# Patient Record
Sex: FEMALE | Race: WHITE | NOT HISPANIC OR LATINO | Employment: OTHER | URBAN - METROPOLITAN AREA
[De-identification: names, ages, dates, MRNs, and addresses within clinical notes are randomized per-mention and may not be internally consistent; named-entity substitution may affect disease eponyms.]

---

## 2018-04-03 ENCOUNTER — HOSPITAL ENCOUNTER (EMERGENCY)
Facility: HOSPITAL | Age: 53
Discharge: HOME | End: 2018-04-03
Attending: EMERGENCY MEDICINE
Payer: MEDICARE

## 2018-04-03 VITALS
DIASTOLIC BLOOD PRESSURE: 85 MMHG | HEIGHT: 66 IN | WEIGHT: 116 LBS | BODY MASS INDEX: 18.64 KG/M2 | TEMPERATURE: 99 F | RESPIRATION RATE: 20 BRPM | HEART RATE: 106 BPM | SYSTOLIC BLOOD PRESSURE: 149 MMHG

## 2018-04-03 DIAGNOSIS — M54.2 NECK PAIN ON LEFT SIDE: ICD-10-CM

## 2018-04-03 DIAGNOSIS — R06.02 SHORTNESS OF BREATH: ICD-10-CM

## 2018-04-03 DIAGNOSIS — R07.89 LEFT-SIDED CHEST WALL PAIN: Primary | ICD-10-CM

## 2018-04-03 DIAGNOSIS — R06.02 SOB (SHORTNESS OF BREATH): ICD-10-CM

## 2018-04-03 DIAGNOSIS — R91.1 PULMONARY NODULE, LEFT: ICD-10-CM

## 2018-04-03 LAB
ALBUMIN SERPL BCP-MCNC: 4 G/DL
ALP SERPL-CCNC: 81 U/L
ALT SERPL W/O P-5'-P-CCNC: 24 U/L
ANION GAP SERPL CALC-SCNC: 11 MMOL/L
AST SERPL-CCNC: 27 U/L
BASOPHILS # BLD AUTO: 0 K/UL
BASOPHILS NFR BLD: 0.3 %
BILIRUB SERPL-MCNC: 0.8 MG/DL
BUN SERPL-MCNC: 7 MG/DL
CALCIUM SERPL-MCNC: 10 MG/DL
CHLORIDE SERPL-SCNC: 103 MMOL/L
CO2 SERPL-SCNC: 25 MMOL/L
CREAT SERPL-MCNC: 0.7 MG/DL
DIFFERENTIAL METHOD: ABNORMAL
EOSINOPHIL # BLD AUTO: 0.1 K/UL
EOSINOPHIL NFR BLD: 1 %
ERYTHROCYTE [DISTWIDTH] IN BLOOD BY AUTOMATED COUNT: 13.1 %
EST. GFR  (AFRICAN AMERICAN): >60 ML/MIN/1.73 M^2
EST. GFR  (NON AFRICAN AMERICAN): >60 ML/MIN/1.73 M^2
GLUCOSE SERPL-MCNC: 87 MG/DL
HCT VFR BLD AUTO: 42.9 %
HGB BLD-MCNC: 14.3 G/DL
INR PPP: 2.3
LYMPHOCYTES # BLD AUTO: 1.3 K/UL
LYMPHOCYTES NFR BLD: 13.6 %
MCH RBC QN AUTO: 30.6 PG
MCHC RBC AUTO-ENTMCNC: 33.2 G/DL
MCV RBC AUTO: 92 FL
MONOCYTES # BLD AUTO: 0.8 K/UL
MONOCYTES NFR BLD: 8.3 %
NEUTROPHILS # BLD AUTO: 7.5 K/UL
NEUTROPHILS NFR BLD: 76.8 %
PLATELET # BLD AUTO: 285 K/UL
PMV BLD AUTO: 7.8 FL
POTASSIUM SERPL-SCNC: 4.2 MMOL/L
PROT SERPL-MCNC: 8.2 G/DL
PROTHROMBIN TIME: 22.7 SEC
RBC # BLD AUTO: 4.66 M/UL
SODIUM SERPL-SCNC: 139 MMOL/L
TROPONIN I SERPL DL<=0.01 NG/ML-MCNC: <0.006 NG/ML
WBC # BLD AUTO: 9.8 K/UL

## 2018-04-03 PROCEDURE — 93005 ELECTROCARDIOGRAM TRACING: CPT

## 2018-04-03 PROCEDURE — 84484 ASSAY OF TROPONIN QUANT: CPT

## 2018-04-03 PROCEDURE — 99284 EMERGENCY DEPT VISIT MOD MDM: CPT | Mod: 25

## 2018-04-03 PROCEDURE — 85025 COMPLETE CBC W/AUTO DIFF WBC: CPT

## 2018-04-03 PROCEDURE — 25500020 PHARM REV CODE 255

## 2018-04-03 PROCEDURE — 36415 COLL VENOUS BLD VENIPUNCTURE: CPT

## 2018-04-03 PROCEDURE — 25000003 PHARM REV CODE 250: Performed by: PHYSICIAN ASSISTANT

## 2018-04-03 PROCEDURE — 80053 COMPREHEN METABOLIC PANEL: CPT

## 2018-04-03 PROCEDURE — 85610 PROTHROMBIN TIME: CPT

## 2018-04-03 RX ORDER — MECLIZINE HYDROCHLORIDE 25 MG/1
25 TABLET ORAL
Status: COMPLETED | OUTPATIENT
Start: 2018-04-03 | End: 2018-04-03

## 2018-04-03 RX ORDER — OXYCODONE AND ACETAMINOPHEN 5; 325 MG/1; MG/1
1 TABLET ORAL EVERY 6 HOURS PRN
Qty: 8 TABLET | Refills: 0 | Status: SHIPPED | OUTPATIENT
Start: 2018-04-03 | End: 2019-07-25

## 2018-04-03 RX ORDER — OXYCODONE AND ACETAMINOPHEN 5; 325 MG/1; MG/1
1 TABLET ORAL
Status: COMPLETED | OUTPATIENT
Start: 2018-04-03 | End: 2018-04-03

## 2018-04-03 RX ORDER — DICLOFENAC SODIUM 10 MG/G
2 GEL TOPICAL 4 TIMES DAILY PRN
Qty: 100 G | Refills: 0 | Status: SHIPPED | OUTPATIENT
Start: 2018-04-03 | End: 2021-04-28 | Stop reason: CLARIF

## 2018-04-03 RX ORDER — SODIUM CHLORIDE 9 MG/ML
INJECTION, SOLUTION INTRAVENOUS
Status: DISCONTINUED
Start: 2018-04-03 | End: 2018-04-03 | Stop reason: HOSPADM

## 2018-04-03 RX ADMIN — OXYCODONE AND ACETAMINOPHEN 1 TABLET: 5; 325 TABLET ORAL at 12:04

## 2018-04-03 RX ADMIN — LIDOCAINE HYDROCHLORIDE: 20 SOLUTION ORAL; TOPICAL at 10:04

## 2018-04-03 RX ADMIN — IOHEXOL 100 ML: 350 INJECTION, SOLUTION INTRAVENOUS at 11:04

## 2018-04-03 RX ADMIN — MECLIZINE HYDROCHLORIDE 25 MG: 25 TABLET ORAL at 10:04

## 2018-04-03 NOTE — ED NOTES
Pt reports shoulder pain increased with deep breath that started yesterday, pt awake alert in no acute distress, family at bedside

## 2018-04-03 NOTE — ED PROVIDER NOTES
"Encounter Date: 4/3/2018    SCRIBE #1 NOTE: IColby am scribing for, and in the presence of, Margarita Aguirre PA-C.       History     Chief Complaint   Patient presents with    Shoulder Pain     pain radiates to neck     Shortness of Breath       04/03/2018 10:21 AM     Chief complaint: Shoulder pain       Marianela Potter is a 52 y.o. female with a hx of Gastric paresis and Hypercoagulable state who presents to the ED with complaints of left shoulder pain with neck radiation associated with pleuritic pain and SOB x 2 days. She describes the neck pain as a shooting pain associated with neck stiffness. The pleuritic pain is a "burning rubbing" pain associated with inhalation. The sx are different then pervious DVT and PE associated sx. She also reports vertigo for sometime exacerbated with standing up after bending over. She took a Xanax at 1 pm and reports compliance with [place] and Coumadin. She has taken Coumadin x 20+ years due to a clotting disorder. Pt denies strenuous lifting, cough and congestion. Allergens include Morphine.       The history is provided by the patient.     Review of patient's allergies indicates:   Allergen Reactions    Morphine      Past Medical History:   Diagnosis Date    Gastric paresis     Hypercoagulable state      Past Surgical History:   Procedure Laterality Date    CHOLECYSTECTOMY      COLON SURGERY      HYSTERECTOMY      ILEOSTOMY      STOMACH SURGERY      TOTAL ABDOMINAL HYSTERECTOMY       Family History   Problem Relation Age of Onset    Colon cancer Neg Hx      Social History   Substance Use Topics    Smoking status: Never Smoker    Smokeless tobacco: Not on file    Alcohol use No     Review of Systems   Constitutional: Negative for activity change, appetite change, chills, fatigue and fever.   Eyes: Negative for visual disturbance.   Respiratory: Positive for shortness of breath. Negative for apnea.         + for "pleuritic pain"   Cardiovascular: " Negative for chest pain and palpitations.   Gastrointestinal: Negative for abdominal distention and abdominal pain.   Genitourinary: Negative for difficulty urinating.   Musculoskeletal: Positive for myalgias and neck pain.   Skin: Negative for pallor and rash.   Neurological: Positive for dizziness. Negative for headaches.   Hematological: Does not bruise/bleed easily.   Psychiatric/Behavioral: Negative for agitation.       Physical Exam     Initial Vitals [04/03/18 0945]   BP Pulse Resp Temp SpO2   (!) 196/106 106 20 98.5 °F (36.9 °C) --      MAP       136         Physical Exam    Nursing note and vitals reviewed.  Constitutional: She appears well-developed and well-nourished. She is not diaphoretic. No distress.   HENT:   Head: Normocephalic and atraumatic.   Right Ear: External ear normal.   Left Ear: External ear normal.   Nose: Nose normal.   Mouth/Throat: Oropharynx is clear and moist.   Eyes: Conjunctivae are normal.   Neck: Normal range of motion. Neck supple.   Cardiovascular: Normal rate, regular rhythm, normal heart sounds and intact distal pulses. Exam reveals no gallop and no friction rub.    No murmur heard.  Pulmonary/Chest: Breath sounds normal. No respiratory distress. She has no wheezes. She has no rhonchi. She has no rales.   Equal, bilateral breath sounds noted without wheezing.      Abdominal: Soft. She exhibits no distension and no mass. There is no tenderness.   Musculoskeletal: Normal range of motion. She exhibits tenderness. She exhibits no edema.        Left shoulder: She exhibits tenderness. She exhibits normal range of motion, no bony tenderness, no swelling, no effusion, no crepitus, no deformity, no laceration and normal strength.        Arms:  Small area of tenderness to left anterolateral neck superior to clavicle. No bony tenderness in left shoulder.     Lymphadenopathy:     She has no cervical adenopathy.   Neurological: She is alert and oriented to person, place, and time. She has  normal strength. No sensory deficit.   Skin: Skin is warm and dry. No rash and no abscess noted. No erythema.   Psychiatric: She has a normal mood and affect.         ED Course   Procedures  Labs Reviewed - No data to display  EKG Readings: (Independently Interpreted)   Initial Reading: No STEMI. Previous EKG: Compared with most recent EKG          Medical Decision Making:   History:   Old Medical Records: I decided to obtain old medical records.  Differential Diagnosis:   Fracture  Dislocation  Sprain  Contusion  Strain  Spasm  PE  Pneumonia  ACS    Clinical Tests:   Lab Tests: Ordered and Reviewed  Radiological Study: Ordered and Reviewed  Medical Tests: Ordered and Reviewed    Imaging Results          CTA Chest Non-Coronary (PE Study) (Final result)  Result time 04/03/18 12:48:15    Final result by Omid Melton MD (04/03/18 12:48:15)                 Impression:      No evidence for pulmonary thromboembolic disease.    New small left apical nodular opacity with surrounding ground-glass change.  Findings are nonspecific and may represent a focal infectious process, however follow-up in 3 months is recommended to exclude a neoplastic process.      Electronically signed by: Omid Melton MD  Date:    04/03/2018  Time:    12:48             Narrative:    EXAMINATION:  CTA CHEST NON CORONARY    CLINICAL HISTORY:  left chest and shoulder pain with SOB; hx PE and clotting disorder;    TECHNIQUE:  Low dose axial images, sagittal and coronal reformations were obtained from the thoracic inlet to the lung bases following the IV administration of 100 mL of Omnipaque 350.  Contrast timing was optimized to evaluate the pulmonary arteries.  MIP images were performed.    COMPARISON:  02/14/2016    FINDINGS:  The right and left main pulmonary arteries and their branch vessels demonstrate no filling defects to indicate pulmonary thromboemboli.    The heart size is normal without pericardial effusion.  There is no hilar  or mediastinal lymphadenopathy.    There is no consolidation or pleural effusion.  At the left lung apex, there is a 9 mm nodular opacity, with hazy surrounding ground-glass density, new since previous examination.    Biapical pleural calcification is noted.  There has been a cholecystectomy.  The bones are intact.                               X-Ray Chest PA And Lateral (Final result)  Result time 04/03/18 11:27:00    Final result by Yuliana Castañeda MD (04/03/18 11:27:00)                 Impression:      Findings consistent with COPD with no acute cardiopulmonary disease demonstrated.      Electronically signed by: Yuliana Castañeda MD  Date:    04/03/2018  Time:    11:27             Narrative:    EXAMINATION:  XR CHEST PA AND LATERAL    CLINICAL HISTORY:  Shortness of breath    TECHNIQUE:  PA and lateral views of the chest were performed.    COMPARISON:  None    FINDINGS:  The heart is not enlarged.  There is hyperinflation of the lungs.  There are apical calcifications suggesting pleural calcifications.  There is no confluent infiltrate or pleural effusion                                 APC / Resident Notes:   Labs stable.  Troponin negative.  INR at 2.3.  Given her history of previous PEs and DVTs, with hypercoagulable state, CTA of chest was performed, which is negative.  Her symptoms may be related to musculoskeletal etiology, given the reproducible TTP of anterolateral left sided neck.  Incidental finding of 9 mm left lung nodule at this apex.  She is made aware of the findings.  She will be discharged home to follow-up with her PCP for re-evaluation in 2-3 days.  She will be given a prescription for Percocet and Voltaren gel.  She voices understanding and is agreeable to the plan.  She is given specific return precautions.        Scribe Attestation:   Scribe #1: I performed the above scribed service and the documentation accurately describes the services I performed. I attest to the accuracy of the  note.    I, Margarita Aguirre PA-C, personally performed the services described in this documentation. All medical record entries made by the scribe were at my direction and in my presence.  I have reviewed the chart and agree that the record reflects my personal performance and is accurate and complete. Margarita Aguirre PA-C.  6:40 PM 04/03/2018             Clinical Impression:     1. SOB (shortness of breath)    2. Shortness of breath      1. Left-sided chest wall pain    2. SOB (shortness of breath)    3. Shortness of breath    4. Pulmonary nodule, left    5. Neck pain on left side                                 Margarita Aguirre PA-C  04/03/18 [id number]

## 2018-04-07 ENCOUNTER — HOSPITAL ENCOUNTER (EMERGENCY)
Facility: HOSPITAL | Age: 53
Discharge: HOME | End: 2018-04-07
Attending: EMERGENCY MEDICINE
Payer: MEDICARE

## 2018-04-07 VITALS
SYSTOLIC BLOOD PRESSURE: 102 MMHG | HEIGHT: 66 IN | TEMPERATURE: 97 F | RESPIRATION RATE: 18 BRPM | OXYGEN SATURATION: 96 % | HEART RATE: 90 BPM | WEIGHT: 113 LBS | BODY MASS INDEX: 18.16 KG/M2 | DIASTOLIC BLOOD PRESSURE: 57 MMHG

## 2018-04-07 DIAGNOSIS — J18.9 COMMUNITY ACQUIRED PNEUMONIA OF LEFT UPPER LOBE OF LUNG: Primary | ICD-10-CM

## 2018-04-07 DIAGNOSIS — R50.9 FEVER: ICD-10-CM

## 2018-04-07 DIAGNOSIS — R79.89 ELEVATED LFTS: ICD-10-CM

## 2018-04-07 LAB
ALBUMIN SERPL BCP-MCNC: 3.3 G/DL
ALP SERPL-CCNC: 134 U/L
ALT SERPL W/O P-5'-P-CCNC: 113 U/L
ANION GAP SERPL CALC-SCNC: 7 MMOL/L
AST SERPL-CCNC: 80 U/L
BACTERIA #/AREA URNS HPF: ABNORMAL /HPF
BASOPHILS # BLD AUTO: 0 K/UL
BASOPHILS NFR BLD: 0.1 %
BILIRUB SERPL-MCNC: 0.5 MG/DL
BILIRUB UR QL STRIP: NEGATIVE
BUN SERPL-MCNC: 7 MG/DL
CALCIUM SERPL-MCNC: 9.8 MG/DL
CHLORIDE SERPL-SCNC: 102 MMOL/L
CLARITY UR: CLEAR
CO2 SERPL-SCNC: 28 MMOL/L
COLOR UR: YELLOW
CREAT SERPL-MCNC: 0.7 MG/DL
DIFFERENTIAL METHOD: ABNORMAL
EOSINOPHIL # BLD AUTO: 0 K/UL
EOSINOPHIL NFR BLD: 0.3 %
ERYTHROCYTE [DISTWIDTH] IN BLOOD BY AUTOMATED COUNT: 12.8 %
EST. GFR  (AFRICAN AMERICAN): >60 ML/MIN/1.73 M^2
EST. GFR  (NON AFRICAN AMERICAN): >60 ML/MIN/1.73 M^2
FLUAV AG SPEC QL IA: NEGATIVE
FLUBV AG SPEC QL IA: NEGATIVE
GLUCOSE SERPL-MCNC: 114 MG/DL
GLUCOSE UR QL STRIP: NEGATIVE
HCT VFR BLD AUTO: 39 %
HGB BLD-MCNC: 12.9 G/DL
HGB UR QL STRIP: ABNORMAL
KETONES UR QL STRIP: NEGATIVE
LEUKOCYTE ESTERASE UR QL STRIP: ABNORMAL
LYMPHOCYTES # BLD AUTO: 0.7 K/UL
LYMPHOCYTES NFR BLD: 6.1 %
MCH RBC QN AUTO: 30.3 PG
MCHC RBC AUTO-ENTMCNC: 33 G/DL
MCV RBC AUTO: 92 FL
MICROSCOPIC COMMENT: ABNORMAL
MONOCYTES # BLD AUTO: 0.8 K/UL
MONOCYTES NFR BLD: 7 %
NEUTROPHILS # BLD AUTO: 10.4 K/UL
NEUTROPHILS NFR BLD: 86.5 %
NITRITE UR QL STRIP: NEGATIVE
PH UR STRIP: 7 [PH] (ref 5–8)
PLATELET # BLD AUTO: 324 K/UL
PMV BLD AUTO: 7.2 FL
POTASSIUM SERPL-SCNC: 4 MMOL/L
PROT SERPL-MCNC: 8 G/DL
PROT UR QL STRIP: NEGATIVE
RBC # BLD AUTO: 4.26 M/UL
RBC #/AREA URNS HPF: 20 /HPF (ref 0–4)
SODIUM SERPL-SCNC: 137 MMOL/L
SP GR UR STRIP: <=1.005 (ref 1–1.03)
SPECIMEN SOURCE: NORMAL
URN SPEC COLLECT METH UR: ABNORMAL
UROBILINOGEN UR STRIP-ACNC: NEGATIVE EU/DL
WBC # BLD AUTO: 12 K/UL
WBC #/AREA URNS HPF: 15 /HPF (ref 0–5)

## 2018-04-07 PROCEDURE — 96361 HYDRATE IV INFUSION ADD-ON: CPT

## 2018-04-07 PROCEDURE — 63600175 PHARM REV CODE 636 W HCPCS: Performed by: EMERGENCY MEDICINE

## 2018-04-07 PROCEDURE — 99284 EMERGENCY DEPT VISIT MOD MDM: CPT | Mod: 25

## 2018-04-07 PROCEDURE — 85025 COMPLETE CBC W/AUTO DIFF WBC: CPT

## 2018-04-07 PROCEDURE — 87400 INFLUENZA A/B EACH AG IA: CPT | Mod: 59

## 2018-04-07 PROCEDURE — 87086 URINE CULTURE/COLONY COUNT: CPT

## 2018-04-07 PROCEDURE — 96365 THER/PROPH/DIAG IV INF INIT: CPT

## 2018-04-07 PROCEDURE — 81000 URINALYSIS NONAUTO W/SCOPE: CPT

## 2018-04-07 PROCEDURE — 80053 COMPREHEN METABOLIC PANEL: CPT

## 2018-04-07 PROCEDURE — 36415 COLL VENOUS BLD VENIPUNCTURE: CPT

## 2018-04-07 PROCEDURE — 25000003 PHARM REV CODE 250: Performed by: EMERGENCY MEDICINE

## 2018-04-07 PROCEDURE — 96375 TX/PRO/DX INJ NEW DRUG ADDON: CPT

## 2018-04-07 RX ORDER — AMOXICILLIN AND CLAVULANATE POTASSIUM 250; 62.5 MG/5ML; MG/5ML
875 POWDER, FOR SUSPENSION ORAL 2 TIMES DAILY
Qty: 252 ML | Refills: 0 | Status: SHIPPED | OUTPATIENT
Start: 2018-04-07 | End: 2018-04-14

## 2018-04-07 RX ORDER — CEFTRIAXONE 1 G/50ML
1 INJECTION, SOLUTION INTRAVENOUS
Status: COMPLETED | OUTPATIENT
Start: 2018-04-07 | End: 2018-04-07

## 2018-04-07 RX ORDER — KETOROLAC TROMETHAMINE 30 MG/ML
10 INJECTION, SOLUTION INTRAMUSCULAR; INTRAVENOUS
Status: COMPLETED | OUTPATIENT
Start: 2018-04-07 | End: 2018-04-07

## 2018-04-07 RX ADMIN — SODIUM CHLORIDE 1000 ML: 0.9 INJECTION, SOLUTION INTRAVENOUS at 04:04

## 2018-04-07 RX ADMIN — KETOROLAC TROMETHAMINE 10 MG: 30 INJECTION, SOLUTION INTRAMUSCULAR at 04:04

## 2018-04-07 RX ADMIN — CEFTRIAXONE 1 G: 1 INJECTION, SOLUTION INTRAVENOUS at 05:04

## 2018-04-07 NOTE — ED PROVIDER NOTES
Encounter Date: 4/7/2018    SCRIBE #1 NOTE: I, Regis Mann, am scribing for, and in the presence of, Dr. Carrasquillo.       History     Chief Complaint   Patient presents with    Fever     s/s x 3 days    Generalized Body Aches       04/07/2018 2:42 PM     Chief complaint: Fever      Marianela Potter is a 52 y.o. female with a PMHx of gastric paresis  who presents to the ED accompanied by her  c/o a fever. She states she presented to this ER Tuesday with shoulder pain. She states she began to have a fever on Wednesday and thought she had a virus. She reports associated symptoms of body aches. She says her fever was as high as 103. She denies having any painful urination, blood in her urine, cough or chest pain. She has been taking tylenol for her fever. Allergens include Morphine.       The history is provided by the patient.     Review of patient's allergies indicates:   Allergen Reactions    Morphine      Past Medical History:   Diagnosis Date    Gastric paresis     Hypercoagulable state      Past Surgical History:   Procedure Laterality Date    CHOLECYSTECTOMY      COLON SURGERY      HYSTERECTOMY      ILEOSTOMY      STOMACH SURGERY      TOTAL ABDOMINAL HYSTERECTOMY       Family History   Problem Relation Age of Onset    Colon cancer Neg Hx      Social History   Substance Use Topics    Smoking status: Never Smoker    Smokeless tobacco: Not on file    Alcohol use No     Review of Systems   Constitutional: Positive for fever (reported at 103).   HENT: Negative for sore throat.    Respiratory: Negative for shortness of breath.    Cardiovascular: Negative for chest pain.   Gastrointestinal: Negative for nausea.   Genitourinary: Negative for dysuria.   Musculoskeletal: Negative for back pain.        Generalized body aches   Skin: Negative for rash.   Neurological: Negative for weakness.   Hematological: Does not bruise/bleed easily.       Physical Exam     Initial Vitals [04/07/18 1435]   BP  Pulse Resp Temp SpO2   (!) 151/79 (!) 115 20 100 °F (37.8 °C) 97 %      MAP       103         Physical Exam    Nursing note and vitals reviewed.  Constitutional: She appears well-developed and well-nourished. She is not diaphoretic. No distress.   HENT:   Head: Normocephalic and atraumatic.   Eyes: EOM are normal. Pupils are equal, round, and reactive to light.   Neck: Normal range of motion. Neck supple.   Cardiovascular: Normal rate, regular rhythm, normal heart sounds and intact distal pulses. Exam reveals no gallop and no friction rub.    No murmur heard.  Pulmonary/Chest: Breath sounds normal. No respiratory distress. She has no wheezes. She has no rhonchi. She has no rales.   Abdominal: Soft. Bowel sounds are normal. There is no tenderness. There is no rebound and no guarding.   Ileostomy noticed in RLQ of abdomen    Musculoskeletal: Normal range of motion.   Neurological: She is alert and oriented to person, place, and time.   Skin: Skin is warm and dry.   Psychiatric: She has a normal mood and affect. Her behavior is normal. Judgment and thought content normal.         ED Course   Procedures  Labs Reviewed   COMPREHENSIVE METABOLIC PANEL - Abnormal; Notable for the following:        Result Value    Glucose 114 (*)     Albumin 3.3 (*)     AST 80 (*)      (*)     Anion Gap 7 (*)     All other components within normal limits   CBC W/ AUTO DIFFERENTIAL - Abnormal; Notable for the following:     MPV 7.2 (*)     Gran # (ANC) 10.4 (*)     Lymph # 0.7 (*)     Gran% 86.5 (*)     Lymph% 6.1 (*)     All other components within normal limits   URINALYSIS - Abnormal; Notable for the following:     Specific Gravity, UA <=1.005 (*)     Occult Blood UA 2+ (*)     Leukocytes, UA 1+ (*)     All other components within normal limits   URINALYSIS MICROSCOPIC - Abnormal; Notable for the following:     RBC, UA 20 (*)     WBC, UA 15 (*)     Bacteria, UA Few (*)     All other components within normal limits   CULTURE, URINE    INFLUENZA A AND B ANTIGEN             Medical Decision Making:   History:   Old Medical Records: I decided to obtain old medical records.  Initial Assessment:   52-year-old female presented with a chief complaint of a fever.  Differential Diagnosis:   Initial differential diagnosis included but not limited to Viral illness, pneumonia, and UTI.  Clinical Tests:   Lab Tests: Ordered and Reviewed  Radiological Study: Reviewed and Ordered  ED Management:  The patient was emergently evaluated in the emergency department, her evaluation was significant for middle-aged female with a benign abdominal exam.  The patient's labs were significant for blood in her urine, and mildly elevated LFTs.  The patient's chest x-ray shows a worsening left upper lobe opacification, that could be pneumonia.  I believe this is likely pneumonia given the constellation of the patient's symptoms.  The patient also reports that she always has blood in her urine and denies any urinary complaints at this time.  We have sent a urine culture off this.  The patient's pneumonia was treated with a dose of IV Rocephin in the emergency department.  The patient is stable for discharge to home.  The patient has asked to be discharged home on liquid antibiotics.  I will discharge her to home on by mouth Augmentin.  She is to follow-up with her PCP for further care.            Scribe Attestation:   Scribe #1: I performed the above scribed service and the documentation accurately describes the services I performed. I attest to the accuracy of the note.              I, Dr. Davion Carrasquillo, personally performed the services described in this documentation. All medical record entries made by the scribe were at my direction and in my presence.  I have reviewed the chart and agree that the record reflects my personal performance and is accurate and complete. Davion Carrasquillo MD.  5:19 PM 04/07/2018    Clinical Impression:   The primary encounter diagnosis was  Community acquired pneumonia of left upper lobe of lung. A diagnosis of Fever was also pertinent to this visit.                           Davion Carrasquillo MD  04/07/18 [id number]

## 2018-04-09 LAB — BACTERIA UR CULT: NO GROWTH

## 2018-11-28 ENCOUNTER — TELEPHONE (OUTPATIENT)
Dept: HEMATOLOGY/ONCOLOGY | Facility: CLINIC | Age: 53
End: 2018-11-28

## 2018-11-28 NOTE — TELEPHONE ENCOUNTER
Spoke with pt and explained that I will need a referral from dr. bai to see her. She said she will stay on top of them to make sure they send it to me. I will contact pt for apt as soon as I get a referral and it is approved by dr moreno.

## 2018-11-28 NOTE — TELEPHONE ENCOUNTER
----- Message from Alva Keyes sent at 11/28/2018  9:47 AM CST -----  Regarding: old/new pt appt  Patient wants to be scheduled with Dr Charlton to have him monitor her lovenox. Last visit w Dr. Charlton was in 2012 and has had Dr. Gallardo managing her up till now. I advised pt to contact Dr. Gallardo office and have her recent records faxed over.

## 2019-01-16 ENCOUNTER — OFFICE VISIT (OUTPATIENT)
Dept: HEMATOLOGY/ONCOLOGY | Facility: CLINIC | Age: 54
End: 2019-01-16
Payer: MEDICARE

## 2019-01-16 VITALS
SYSTOLIC BLOOD PRESSURE: 163 MMHG | WEIGHT: 113 LBS | BODY MASS INDEX: 18.16 KG/M2 | HEIGHT: 66 IN | RESPIRATION RATE: 18 BRPM | TEMPERATURE: 98 F | DIASTOLIC BLOOD PRESSURE: 88 MMHG | HEART RATE: 96 BPM

## 2019-01-16 DIAGNOSIS — Z86.73 HISTORY OF TRANSIENT ISCHEMIC ATTACK (TIA): ICD-10-CM

## 2019-01-16 DIAGNOSIS — Z83.79 FAMILY HISTORY OF CHRONIC ULCERATIVE COLITIS: ICD-10-CM

## 2019-01-16 DIAGNOSIS — Z15.89 HOMOZYGOUS MTHFR MUTATION C677T: ICD-10-CM

## 2019-01-16 DIAGNOSIS — Z95.828 S/P INSERTION OF IVC (INFERIOR VENA CAVAL) FILTER: ICD-10-CM

## 2019-01-16 DIAGNOSIS — R91.1 NODULE OF UPPER LOBE OF RIGHT LUNG: ICD-10-CM

## 2019-01-16 DIAGNOSIS — Z90.49 HISTORY OF TOTAL COLECTOMY: ICD-10-CM

## 2019-01-16 DIAGNOSIS — K51.011 ULCERATIVE PANCOLITIS WITH RECTAL BLEEDING: ICD-10-CM

## 2019-01-16 DIAGNOSIS — D68.52 PROTHROMBIN GENE MUTATION: ICD-10-CM

## 2019-01-16 DIAGNOSIS — Z86.711 HISTORY OF PULMONARY EMBOLUS (PE): ICD-10-CM

## 2019-01-16 PROCEDURE — 99204 OFFICE O/P NEW MOD 45 MIN: CPT | Mod: ,,, | Performed by: INTERNAL MEDICINE

## 2019-01-16 PROCEDURE — 99204 PR OFFICE/OUTPT VISIT, NEW, LEVL IV, 45-59 MIN: ICD-10-PCS | Mod: ,,, | Performed by: INTERNAL MEDICINE

## 2019-01-16 RX ORDER — WARFARIN 4 MG/1
4 TABLET ORAL NIGHTLY
COMMUNITY
End: 2022-04-22 | Stop reason: SDUPTHER

## 2019-01-16 RX ORDER — LISINOPRIL 2.5 MG/1
2.5 TABLET ORAL DAILY
COMMUNITY
End: 2021-04-28 | Stop reason: CLARIF

## 2019-01-18 ENCOUNTER — TELEPHONE (OUTPATIENT)
Dept: HEMATOLOGY/ONCOLOGY | Facility: CLINIC | Age: 54
End: 2019-01-18

## 2019-01-18 PROBLEM — Z83.79 FAMILY HISTORY OF CHRONIC ULCERATIVE COLITIS: Status: ACTIVE | Noted: 2019-01-18

## 2019-01-18 PROBLEM — Z90.49 HISTORY OF TOTAL COLECTOMY: Status: ACTIVE | Noted: 2019-01-18

## 2019-01-18 PROBLEM — Z95.828 S/P INSERTION OF IVC (INFERIOR VENA CAVAL) FILTER: Status: ACTIVE | Noted: 2019-01-18

## 2019-01-18 PROBLEM — R91.1 NODULE OF UPPER LOBE OF RIGHT LUNG: Status: ACTIVE | Noted: 2019-01-18

## 2019-01-18 PROBLEM — K51.90 ULCERATIVE COLITIS: Status: ACTIVE | Noted: 2019-01-18

## 2019-01-18 PROBLEM — Z15.89 HOMOZYGOUS MTHFR MUTATION C677T: Status: ACTIVE | Noted: 2019-01-18

## 2019-01-18 PROBLEM — Z86.711 HISTORY OF PULMONARY EMBOLUS (PE): Status: ACTIVE | Noted: 2019-01-18

## 2019-01-18 PROBLEM — D68.52 PROTHROMBIN GENE MUTATION: Status: ACTIVE | Noted: 2019-01-18

## 2019-01-18 PROBLEM — Z86.73 HISTORY OF TRANSIENT ISCHEMIC ATTACK (TIA): Status: ACTIVE | Noted: 2019-01-18

## 2019-01-18 NOTE — PROGRESS NOTES
[facility] History & Physical    Subjective:      Patient ID:   Marianela Potter  53 y.o. female  1965  [place]      Chief Complaint:   PE hx    HPI:  53 y.o. female with remote history of pulmonary embolus ×2.    Her past history is complex. She has history of ulcerative colitis and had proctocolectomy with ileostomy in 1995. She also had a cholecystectomy in 2005. This was followed by a complete hysterectomy in October 2012 for endometriosis. 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 She was also diagnosed with gastroparesis in 2008.    She has a history of iron deficiency anemia and has been given Ferrlicet infusions in the past in 5/2012.  She has taken Reglan intermittently to help with gastroparesis symptoms.    She is status post pulmonary embolus ×2, TIA ×1, and has a inferior vena cava filter in place.    Hypercoagulation evaluation was done in 2011. She is heterozygous positive for   Prothrombin gene mutation. She is homozygous positive for MT HFR mutation. She had been on Coumadin and aspirin at that time and Foltx was added also.    Recently she had pneumonia in 2018. A 9 mm nodule was found in the left upper lobe in June 2018. Recent PET scan showed slight uptake in the nodule. Bronchoscopy was done. Cultures are pending and are negative thus far, malignancy was not identified, and she is to follow-up with Dr. Brush of pulmonary for this.    She is presently on Coumadin 4 mg daily, INR has been 2.3, she has been covered with Lovenox bridge as needed in the past.    She has a history of allergy to morphine as manifested with itch. She does not smoke. She does not drink alcohol with regularity. She has worked at [employer] for several years.    She has family history of hypertension and pulmonary embolus. She has a sister with breast cancer history.    ROS:   GEN: normal without  any fever, night sweats or weight loss  HEENT: normal with no HA's, sore throat, stiff neck, changes in vision  CV: normal with no CP, SOB, PND, VERDUZCO or orthopnea  PULM: See history of present illness  GI: See history of present illness  : normal with no hematuria, dysuria  BREAST: normal with no mass, discharge, pain  SKIN: normal with no rash, erythema, bruising, or swelling     Past Medical History:   Diagnosis Date    Clotting disorder     Gastric paresis     Hypercoagulable state      Past Surgical History:   Procedure Laterality Date    CHOLECYSTECTOMY      COLON SURGERY      HYSTERECTOMY      ILEOSTOMY      STOMACH SURGERY      superior Mesenteric Artery Syndrome      TOTAL ABDOMINAL HYSTERECTOMY         Review of patient's allergies indicates:   Allergen Reactions    Morphine      Social History     Socioeconomic History    Marital status:      Spouse name: Not on file    Number of children: Not on file    Years of education: Not on file    Highest education level: Not on file   Social Needs    Financial resource strain: Not on file    Food insecurity - worry: Not on file    Food insecurity - inability: Not on file    Transportation needs - medical: Not on file    Transportation needs - non-medical: Not on file   Occupational History    Not on file   Tobacco Use    Smoking status: Never Smoker   Substance and Sexual Activity    Alcohol use: No    Drug use: No    Sexual activity: Not on file   Other Topics Concern    Not on file   Social History Narrative    Not on file         Current Outpatient Medications:     alprazolam (XANAX) 1 MG tablet, Take 2 mg by mouth nightly as needed for Anxiety., Disp: , Rfl:     hydrocodone-acetaminophen 10-325mg (NORCO)  mg Tab, Take by mouth., Disp: , Rfl:     lisinopril (PRINIVIL,ZESTRIL) 2.5 MG tablet, Take 2.5 mg by mouth once daily., Disp: , Rfl:     metoclopramide HCl (REGLAN) 10 MG tablet, Take 10 mg by mouth 4 (four) times  "daily., Disp: , Rfl:     promethazine (PHENERGAN) 25 MG tablet, Take 25 mg by mouth daily as needed for Nausea., Disp: , Rfl:     warfarin (COUMADIN) 4 MG tablet, Take 4 mg by mouth Daily., Disp: , Rfl:     diclofenac sodium 1 % Gel, Apply 2 g topically 4 (four) times daily as needed., Disp: 100 g, Rfl: 0    doxycycline (VIBRAMYCIN) 100 MG Cap, Take 100 mg by mouth 2 (two) times daily., Disp: , Rfl:     lipase-protease-amylase 24,000-76,000-120,000 units (CREON) 24,000-76,000 -120,000 unit capsule, Take 1 capsule by mouth 3 (three) times daily with meals., Disp: 90 capsule, Rfl: 11    ondansetron (ZOFRAN) 8 MG tablet, Take 8 mg by mouth every 8 (eight) hours., Disp: , Rfl:     oxyCODONE-acetaminophen (PERCOCET) 5-325 mg per tablet, Take 1 tablet by mouth every 6 (six) hours as needed for Pain., Disp: 8 tablet, Rfl: 0    pantoprazole (PROTONIX) 40 MG tablet, Take 40 mg by mouth once daily., Disp: , Rfl:     warfarin (COUMADIN) 5 MG tablet, Take 5 mg by mouth every Mon, Wed, Fri. 2.5 mg other days, Disp: , Rfl:           Objective:   Vitals:  Blood pressure (!) 163/88, pulse 96, temperature 98 °F (36.7 °C), resp. rate 18, height 5' 6" (1.676 m), weight 51.3 kg (113 lb).    Physical Examination:   GEN: no apparent distress, comfortable  HEAD: atraumatic and normocephalic  EYES: no pallor, no icterus  ENT:  no pharyngeal erythema, external ears WNL; no nasal discharge; no thrush  NECK: no masses, thyroid normal, trachea midline, no LAD/LN's, supple  CV: RRR with no murmur; normal pulse; normal S1 and S2; no pedal edema  CHEST: Normal respiratory effort; CTAB; normal breath sounds; no wheeze or crackles  ABDOM: nontender and nondistended; soft; normal bowel sounds; no rebound/guarding, liver and spleen are not palpable, ileostomy is intact and appears functional.  MUSC/Skeletal: ROM normal; no crepitus; joints normal; no deformities or arthropathy  EXTREM: no clubbing, cyanosis, inflammation or swelling  SKIN: " no rashes, lesions, ulcers, petechiae or subcutaneous nodules  : no cedillo  NEURO: grossly intact; motor/sensory WNL; no tremors  PSYCH: normal mood, affect and behavior  LYMPH: normal cervical, supraclavicular, axillary and groin LN's  BREASTS: Nontender without palpable mass at the left or right breast    Labs:   Lab Results   Component Value Date    WBC 12.00 04/07/2018    HGB 12.9 04/07/2018    HCT 39.0 04/07/2018    MCV 92 04/07/2018     04/07/2018    CMP  Sodium   Date Value Ref Range Status   04/07/2018 137 136 - 145 mmol/L Final     Potassium   Date Value Ref Range Status   04/07/2018 4.0 3.5 - 5.1 mmol/L Final     Chloride   Date Value Ref Range Status   04/07/2018 102 95 - 110 mmol/L Final     CO2   Date Value Ref Range Status   04/07/2018 28 23 - 29 mmol/L Final     Glucose   Date Value Ref Range Status   04/07/2018 114 (H) 70 - 110 mg/dL Final     BUN, Bld   Date Value Ref Range Status   04/07/2018 7 6 - 20 mg/dL Final     Creatinine   Date Value Ref Range Status   04/07/2018 0.7 0.5 - 1.4 mg/dL Final     Calcium   Date Value Ref Range Status   04/07/2018 9.8 8.7 - 10.5 mg/dL Final     Total Protein   Date Value Ref Range Status   04/07/2018 8.0 6.0 - 8.4 g/dL Final     Albumin   Date Value Ref Range Status   04/07/2018 3.3 (L) 3.5 - 5.2 g/dL Final     Total Bilirubin   Date Value Ref Range Status   04/07/2018 0.5 0.1 - 1.0 mg/dL Final     Comment:     For infants and newborns, interpretation of results should be based  on gestational age, weight and in agreement with clinical  observations.  Premature Infant recommended reference ranges:  Up to 24 hours.............<8.0 mg/dL  Up to 48 hours............<12.0 mg/dL  3-5 days..................<15.0 mg/dL  6-29 days.................<15.0 mg/dL       Alkaline Phosphatase   Date Value Ref Range Status   04/07/2018 134 55 - 135 U/L Final     AST   Date Value Ref Range Status   04/07/2018 80 (H) 10 - 40 U/L Final     ALT   Date Value Ref Range Status    04/07/2018 113 (H) 10 - 44 U/L Final     Anion Gap   Date Value Ref Range Status   04/07/2018 7 (L) 8 - 16 mmol/L Final     eGFR if    Date Value Ref Range Status   04/07/2018 >60 >60 mL/min/1.73 m^2 Final     eGFR if non    Date Value Ref Range Status   04/07/2018 >60 >60 mL/min/1.73 m^2 Final     Comment:     Calculation used to obtain the estimated glomerular filtration  rate (eGFR) is the CKD-EPI equation.        Lab per Dr. Flynn show vitamin D low at 24.8, creatinine normal at 0.5, B-12 normal at 1143, folate greater than 20, ferritin is normal at 89    Assessment:   (1) 53 y.o. female with diagnosis of pulmonary embolus ×2 and TIA ×1 .  She has history of heterozygous prothrombin gene mutation and homozygous   MTHFR mutation.  Status post IVC filter placement.    She is on Coumadin prophylaxis, 4 mg by mouth daily, INR will be checked monthly.    (2) she has history of ulcerative colitis, status post proctocolectomy with ileostomy. 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  History of gastroparesis.    (3) right upper lobe nodule, under evaluation per Dr. Brush of pulmonary.    (4) check homocysteine level, she is not all Foltx or multivitamin    Have discussed with her, that we could change her over to Xarelto, Eliquis or Pradaxa  Prophylaxis long-term and discontinue her Coumadin. The dosage or administration of these medications would probably not be influenced by her diet, oral intake, or other medications. She is hesitant to come off her Coumadin and go to another drug.    For now she will get her lab work done and follow-up here in 1 or 2 weeks. I will titrate her Coumadin long-term.

## 2019-01-23 ENCOUNTER — OFFICE VISIT (OUTPATIENT)
Dept: HEMATOLOGY/ONCOLOGY | Facility: CLINIC | Age: 54
End: 2019-01-23
Payer: MEDICARE

## 2019-01-23 VITALS
OXYGEN SATURATION: 99 % | BODY MASS INDEX: 18.24 KG/M2 | HEART RATE: 80 BPM | DIASTOLIC BLOOD PRESSURE: 92 MMHG | WEIGHT: 113 LBS | SYSTOLIC BLOOD PRESSURE: 155 MMHG | TEMPERATURE: 98 F

## 2019-01-23 DIAGNOSIS — Z86.711 HISTORY OF PULMONARY EMBOLUS (PE): Primary | ICD-10-CM

## 2019-01-23 DIAGNOSIS — Z95.828 S/P INSERTION OF IVC (INFERIOR VENA CAVAL) FILTER: ICD-10-CM

## 2019-01-23 DIAGNOSIS — R91.1 NODULE OF UPPER LOBE OF RIGHT LUNG: ICD-10-CM

## 2019-01-23 DIAGNOSIS — Z83.79 FAMILY HISTORY OF CHRONIC ULCERATIVE COLITIS: ICD-10-CM

## 2019-01-23 DIAGNOSIS — D68.52 PROTHROMBIN GENE MUTATION: ICD-10-CM

## 2019-01-23 DIAGNOSIS — Z15.89 HOMOZYGOUS MTHFR MUTATION C677T: ICD-10-CM

## 2019-01-23 DIAGNOSIS — Z90.49 HISTORY OF TOTAL COLECTOMY: ICD-10-CM

## 2019-01-23 DIAGNOSIS — Z86.73 HISTORY OF TRANSIENT ISCHEMIC ATTACK (TIA): ICD-10-CM

## 2019-01-23 PROCEDURE — 99213 OFFICE O/P EST LOW 20 MIN: CPT | Mod: ,,, | Performed by: INTERNAL MEDICINE

## 2019-01-23 PROCEDURE — 99213 PR OFFICE/OUTPT VISIT, EST, LEVL III, 20-29 MIN: ICD-10-PCS | Mod: ,,, | Performed by: INTERNAL MEDICINE

## 2019-01-23 NOTE — PROGRESS NOTES
[facility] History & Physical    Subjective:      Patient ID:   Marianela Potter  53 y.o. female  1965        Chief Complaint:   PE hx    HPI:  53 y.o. female with remote history of pulmonary embolus ×2.    Her past history is complex. She has history of ulcerative colitis and had proctocolectomy with ileostomy in 1995. She also had a cholecystectomy in 2005. This was followed by a complete hysterectomy in October 2012 for endometriosis. 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 She was also diagnosed with gastroparesis in 2008.    She has a history of iron deficiency anemia and has been given Ferrlicet infusions in the past in 5/2012.  She has taken Reglan intermittently to help with gastroparesis symptoms.    She is status post pulmonary embolus ×2, TIA ×1, and has a inferior vena cava filter in place.    Hypercoagulation evaluation was done in 2011. She is heterozygous positive for   Prothrombin gene mutation. She is homozygous positive for MT HFR mutation. She had been on Coumadin and aspirin at that time and Foltx was added also.    Recently she had pneumonia in 2018. A 9 mm nodule was found in the left upper lobe in June 2018. Recent PET scan showed slight uptake in the nodule. Bronchoscopy was done. Cultures are pending and are negative thus far, malignancy was not identified, and she is to follow-up with Dr. Brush of pulmonary for this.    She is presently on Coumadin 4 mg daily, INR has been 2.3, she has been covered with Lovenox bridge as needed in the past.    She has a history of allergy to morphine as manifested with itch. She does not smoke. She does not drink alcohol with regularity. She has worked at [employer] for several years.    She has family history of hypertension and pulmonary embolus. She has a sister with breast cancer history.    ROS:   GEN: normal without any  fever, night sweats or weight loss  HEENT: normal with no HA's, sore throat, stiff neck, changes in vision  CV: normal with no CP, SOB, PND, VERDUZCO or orthopnea  PULM: See history of present illness  GI: See history of present illness  : normal with no hematuria, dysuria  BREAST: normal with no mass, discharge, pain  SKIN: normal with no rash, erythema, bruising, or swelling     Past Medical History:   Diagnosis Date    Clotting disorder     Gastric paresis     Hypercoagulable state      Past Surgical History:   Procedure Laterality Date    CHOLECYSTECTOMY      COLON SURGERY      HYSTERECTOMY      ILEOSTOMY      STOMACH SURGERY      superior Mesenteric Artery Syndrome      TOTAL ABDOMINAL HYSTERECTOMY         Review of patient's allergies indicates:   Allergen Reactions    Morphine      Social History     Socioeconomic History    Marital status:      Spouse name: Not on file    Number of children: Not on file    Years of education: Not on file    Highest education level: Not on file   Social Needs    Financial resource strain: Not on file    Food insecurity - worry: Not on file    Food insecurity - inability: Not on file    Transportation needs - medical: Not on file    Transportation needs - non-medical: Not on file   Occupational History    Not on file   Tobacco Use    Smoking status: Never Smoker   Substance and Sexual Activity    Alcohol use: No    Drug use: No    Sexual activity: Not on file   Other Topics Concern    Not on file   Social History Narrative    Not on file         Current Outpatient Medications:     alprazolam (XANAX) 1 MG tablet, Take 2 mg by mouth nightly as needed for Anxiety., Disp: , Rfl:     doxycycline (VIBRAMYCIN) 100 MG Cap, Take 100 mg by mouth 2 (two) times daily., Disp: , Rfl:     hydrocodone-acetaminophen 10-325mg (NORCO)  mg Tab, Take by mouth., Disp: , Rfl:     lisinopril (PRINIVIL,ZESTRIL) 2.5 MG tablet, Take 2.5 mg by mouth once daily.,  Disp: , Rfl:     metoclopramide HCl (REGLAN) 10 MG tablet, Take 10 mg by mouth 4 (four) times daily., Disp: , Rfl:     ondansetron (ZOFRAN) 8 MG tablet, Take 8 mg by mouth every 8 (eight) hours., Disp: , Rfl:     oxyCODONE-acetaminophen (PERCOCET) 5-325 mg per tablet, Take 1 tablet by mouth every 6 (six) hours as needed for Pain., Disp: 8 tablet, Rfl: 0    pantoprazole (PROTONIX) 40 MG tablet, Take 40 mg by mouth once daily., Disp: , Rfl:     promethazine (PHENERGAN) 25 MG tablet, Take 25 mg by mouth daily as needed for Nausea., Disp: , Rfl:     warfarin (COUMADIN) 4 MG tablet, Take 4 mg by mouth Daily., Disp: , Rfl:     warfarin (COUMADIN) 5 MG tablet, Take 5 mg by mouth every Mon, Wed, Fri. 2.5 mg other days, Disp: , Rfl:     diclofenac sodium 1 % Gel, Apply 2 g topically 4 (four) times daily as needed., Disp: 100 g, Rfl: 0    lipase-protease-amylase 24,000-76,000-120,000 units (CREON) 24,000-76,000 -120,000 unit capsule, Take 1 capsule by mouth 3 (three) times daily with meals., Disp: 90 capsule, Rfl: 11          Objective:   Vitals:  Blood pressure (!) 155/92, pulse 80, temperature 98.2 °F (36.8 °C), weight 51.3 kg (113 lb), SpO2 99 %.    Physical Examination:   GEN: no apparent distress, comfortable  HEAD: atraumatic and normocephalic  EYES: no pallor, no icterus  ENT:  no pharyngeal erythema, external ears WNL; no nasal discharge; no thrush  NECK: no masses, thyroid normal, trachea midline, no LAD/LN's, supple  CV: RRR with no murmur; normal pulse; normal S1 and S2; no pedal edema  CHEST: Normal respiratory effort; CTAB; normal breath sounds; no wheeze or crackles  ABDOM: nontender and nondistended; soft;  no rebound/guarding, liver and spleen are not palpable, ileostomy is intact and appears functional.  MUSC/Skeletal: ROM normal; no crepitus; joints normal; no deformities  EXTREM: no clubbing, cyanosis, inflammation or swelling  SKIN: no rashes, lesions, ulcers, petechiae   : no cvat  NEURO:  grossly intact; motor/sensory WNL; no tremors  PSYCH: normal mood, affect and behavior  LYMPH: normal cervical, supraclavicular, axillary and groin LN's  BREASTS: Nontender without palpable mass at the left or right breast    Labs:   Lab Results   Component Value Date    WBC 12.00 04/07/2018    HGB 12.9 04/07/2018    HCT 39.0 04/07/2018    MCV 92 04/07/2018     04/07/2018    CMP  Sodium   Date Value Ref Range Status   04/07/2018 137 136 - 145 mmol/L Final     Potassium   Date Value Ref Range Status   04/07/2018 4.0 3.5 - 5.1 mmol/L Final     Chloride   Date Value Ref Range Status   04/07/2018 102 95 - 110 mmol/L Final     CO2   Date Value Ref Range Status   04/07/2018 28 23 - 29 mmol/L Final     Glucose   Date Value Ref Range Status   04/07/2018 114 (H) 70 - 110 mg/dL Final     BUN, Bld   Date Value Ref Range Status   04/07/2018 7 6 - 20 mg/dL Final     Creatinine   Date Value Ref Range Status   04/07/2018 0.7 0.5 - 1.4 mg/dL Final     Calcium   Date Value Ref Range Status   04/07/2018 9.8 8.7 - 10.5 mg/dL Final     Total Protein   Date Value Ref Range Status   04/07/2018 8.0 6.0 - 8.4 g/dL Final     Albumin   Date Value Ref Range Status   04/07/2018 3.3 (L) 3.5 - 5.2 g/dL Final     Total Bilirubin   Date Value Ref Range Status   04/07/2018 0.5 0.1 - 1.0 mg/dL Final     Comment:     For infants and newborns, interpretation of results should be based  on gestational age, weight and in agreement with clinical  observations.  Premature Infant recommended reference ranges:  Up to 24 hours.............<8.0 mg/dL  Up to 48 hours............<12.0 mg/dL  3-5 days..................<15.0 mg/dL  6-29 days.................<15.0 mg/dL       Alkaline Phosphatase   Date Value Ref Range Status   04/07/2018 134 55 - 135 U/L Final     AST   Date Value Ref Range Status   04/07/2018 80 (H) 10 - 40 U/L Final     ALT   Date Value Ref Range Status   04/07/2018 113 (H) 10 - 44 U/L Final     Anion Gap   Date Value Ref Range Status    04/07/2018 7 (L) 8 - 16 mmol/L Final     eGFR if    Date Value Ref Range Status   04/07/2018 >60 >60 mL/min/1.73 m^2 Final     eGFR if non    Date Value Ref Range Status   04/07/2018 >60 >60 mL/min/1.73 m^2 Final     Comment:     Calculation used to obtain the estimated glomerular filtration  rate (eGFR) is the CKD-EPI equation.        Lab per Dr. Flynn show vitamin D low at 24.8, creatinine normal at 0.5, B-12 normal at 1143, folate greater than 20, ferritin is normal at 89    Assessment:   (1) 53 y.o. female with diagnosis of pulmonary embolus ×2 and TIA ×1 .  She has history of heterozygous prothrombin gene mutation and homozygous   MTHFR mutation.  Status post IVC filter placement.    She is on Coumadin prophylaxis, 4 -5  mg by mouth qod , INR will be checked monthly.    (2) she has history of ulcerative colitis, status post proctocolectomy with ileostomy. 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  History of gastroparesis.    (3) right upper lobe nodule, under evaluation per Dr. Brush of pulmonary.  Bronchoscopy results negative thus far.    (4) homocysteine level NL  4.8. On multivitamen    Have discussed with her, that we could change her over to Xarelto, Eliquis or Pradaxa  Prophylaxis long-term and discontinue her Coumadin. The dosage or administration of these medications would probably not be influenced by her diet, oral intake, or other medications. She is hesitant to come off her Coumadin and go to another drug.  She wants to stay on Coumadin.    RTC 6 months.

## 2019-02-01 ENCOUNTER — TELEPHONE (OUTPATIENT)
Dept: HEMATOLOGY/ONCOLOGY | Facility: CLINIC | Age: 54
End: 2019-02-01

## 2019-02-15 ENCOUNTER — TELEPHONE (OUTPATIENT)
Dept: HEMATOLOGY/ONCOLOGY | Facility: CLINIC | Age: 54
End: 2019-02-15

## 2019-02-15 NOTE — TELEPHONE ENCOUNTER
----- Message from Chiquita Damian sent at 2/15/2019  2:19 PM CST -----  Please call with pt/inr results from the 13th at [facility]# [phone]

## 2019-03-01 ENCOUNTER — TELEPHONE (OUTPATIENT)
Dept: HEMATOLOGY/ONCOLOGY | Facility: CLINIC | Age: 54
End: 2019-03-01

## 2019-03-01 NOTE — TELEPHONE ENCOUNTER
----- Message from Chiquita Damian sent at 3/1/2019 11:01 AM CST -----  Pt had labs done and would like a nurse to call them back with results. Wednesday @ [facility]# [phone]      Thanks,  Chiquita

## 2019-05-20 ENCOUNTER — TELEPHONE (OUTPATIENT)
Dept: HEMATOLOGY/ONCOLOGY | Facility: CLINIC | Age: 54
End: 2019-05-20

## 2019-05-20 NOTE — TELEPHONE ENCOUNTER
----- Message from Angely Yost sent at 5/20/2019 12:12 PM CDT -----  The patient would like to get her lab results from Thursday. Please call her back at [phone].

## 2019-07-24 ENCOUNTER — TELEPHONE (OUTPATIENT)
Dept: HEMATOLOGY/ONCOLOGY | Facility: CLINIC | Age: 54
End: 2019-07-24

## 2019-07-25 ENCOUNTER — OFFICE VISIT (OUTPATIENT)
Dept: HEMATOLOGY/ONCOLOGY | Facility: CLINIC | Age: 54
End: 2019-07-25
Payer: MEDICARE

## 2019-07-25 VITALS
BODY MASS INDEX: 18.4 KG/M2 | SYSTOLIC BLOOD PRESSURE: 136 MMHG | TEMPERATURE: 97 F | HEART RATE: 62 BPM | DIASTOLIC BLOOD PRESSURE: 81 MMHG | RESPIRATION RATE: 20 BRPM | WEIGHT: 114 LBS

## 2019-07-25 DIAGNOSIS — Z95.828 S/P INSERTION OF IVC (INFERIOR VENA CAVAL) FILTER: ICD-10-CM

## 2019-07-25 DIAGNOSIS — D68.52 PROTHROMBIN GENE MUTATION: ICD-10-CM

## 2019-07-25 DIAGNOSIS — Z90.49 HISTORY OF TOTAL COLECTOMY: ICD-10-CM

## 2019-07-25 DIAGNOSIS — K51.011 ULCERATIVE PANCOLITIS WITH RECTAL BLEEDING: ICD-10-CM

## 2019-07-25 DIAGNOSIS — Z86.711 HISTORY OF PULMONARY EMBOLUS (PE): Primary | ICD-10-CM

## 2019-07-25 DIAGNOSIS — Z15.89 HOMOZYGOUS MTHFR MUTATION C677T: ICD-10-CM

## 2019-07-25 DIAGNOSIS — R91.1 NODULE OF UPPER LOBE OF RIGHT LUNG: ICD-10-CM

## 2019-07-25 DIAGNOSIS — Z86.73 HISTORY OF TRANSIENT ISCHEMIC ATTACK (TIA): ICD-10-CM

## 2019-07-25 DIAGNOSIS — Z83.79 FAMILY HISTORY OF CHRONIC ULCERATIVE COLITIS: ICD-10-CM

## 2019-07-25 PROCEDURE — 99214 OFFICE O/P EST MOD 30 MIN: CPT | Mod: ,,, | Performed by: INTERNAL MEDICINE

## 2019-07-25 PROCEDURE — 99214 PR OFFICE/OUTPT VISIT, EST, LEVL IV, 30-39 MIN: ICD-10-PCS | Mod: ,,, | Performed by: INTERNAL MEDICINE

## 2019-07-25 NOTE — LETTER
July 25, 2019      Sage Gallardo MD  [address] [facility] [place] [address]           [facility]  [address]  [place] [address]  Phone: [phone]  Fax: [phone]          Patient: Marianela Potter   MR Number: [id number]   YOB: 1965   Date of Visit: 7/25/2019       Dear Dr. Sage Gallardo:    Thank you for referring Marianela Potter to me for evaluation. Attached you will find relevant portions of my assessment and plan of care.    If you have questions, please do not hesitate to call me. I look forward to following Marianela Potter along with you.    Sincerely,    LEONEL Trevizo MD    Enclosure  CC:  No Recipients    If you would like to receive this communication electronically, please contact [email or web address] or [phone] to request more information on [employer] access.    For providers and/or their staff who would like to refer a patient to [facility], please contact us through our one-stop-shop provider referral line, [facility], at [phone].    If you feel you have received this communication in error or would no longer like to receive these types of communications, please e-mail [email or web address]

## 2019-07-26 NOTE — PROGRESS NOTES
[facility] History & Physical    Subjective:      Patient ID:   Marianela Potter  53 y.o. female  1965  [place]      Chief Complaint:   PE hx    HPI:  53 y.o. female with remote history of pulmonary embolus ×2.    Her past history is complex. She has history of ulcerative colitis and had proctocolectomy with ileostomy in 1995. She also had a cholecystectomy in 2005. This was followed by a complete hysterectomy in October 2012 for endometriosis. 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 She was also diagnosed with gastroparesis in 2008.    She has a history of iron deficiency anemia and has been given Ferrlicet infusions in the past in 5/2012.  She has taken Reglan intermittently to help with gastroparesis symptoms.    She has gastroparesis and intermittent dumping sx.    She is status post pulmonary embolus ×2, TIA ×1, and has a inferior vena cava filter in place.    Hypercoagulation evaluation was done in 2011. She is heterozygous positive for   Prothrombin gene mutation. She is homozygous positive for MT HFR mutation. She had been on Coumadin and aspirin at that time and Foltx was added also.    Recently she had pneumonia in 2018. A 9 mm nodule was found in the left upper lobe in June 2018. Recent PET scan showed slight uptake in the nodule. Bronchoscopy was done. Cultures are pending and are negative thus far, malignancy was not identified, and she is to follow-up with Dr. Brush of pulmonary for this.    She is presently on Coumadin 5 mg x's 3 days, then 4 mg daily and recheck INR in 2 weeks.    She has pain at L shoulder blade area, currently on PT.  Dr. Gallardo gave her steroids, IV and po.  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    She has a history of allergy to morphine as manifested with itch. She does not smoke. She does not drink alcohol with regularity. She has worked at [employer]  for several years.    She has family history of hypertension and pulmonary embolus. She has a sister with breast cancer history.    ROS:   GEN: normal without any fever, night sweats or weight loss  HEENT: normal with no HA's, sore throat, stiff neck, changes in vision  CV: normal with no CP, SOB, PND, VERDUZCO or orthopnea  PULM: See history of present illness  GI: See history of present illness  : normal with no hematuria, dysuria  BREAST: normal with no mass, discharge, pain  SKIN: normal with no rash, erythema, bruising, or swelling         Review of patient's allergies indicates:   Allergen Reactions    Morphine            Current Outpatient Medications:     alprazolam (XANAX) 1 MG tablet, Take 2 mg by mouth nightly as needed for Anxiety., Disp: , Rfl:     hydrocodone-acetaminophen 10-325mg (NORCO)  mg Tab, Take by mouth., Disp: , Rfl:     lisinopril (PRINIVIL,ZESTRIL) 2.5 MG tablet, Take 2.5 mg by mouth once daily., Disp: , Rfl:     metoclopramide HCl (REGLAN) 10 MG tablet, Take 10 mg by mouth 4 (four) times daily., Disp: , Rfl:     pantoprazole (PROTONIX) 40 MG tablet, Take 40 mg by mouth once daily., Disp: , Rfl:     promethazine (PHENERGAN) 25 MG tablet, Take 25 mg by mouth daily as needed for Nausea., Disp: , Rfl:     warfarin (COUMADIN) 4 MG tablet, Take 4 mg by mouth Daily., Disp: , Rfl:     diclofenac sodium 1 % Gel, Apply 2 g topically 4 (four) times daily as needed., Disp: 100 g, Rfl: 0    lipase-protease-amylase 24,000-76,000-120,000 units (CREON) 24,000-76,000 -120,000 unit capsule, Take 1 capsule by mouth 3 (three) times daily with meals., Disp: 90 capsule, Rfl: 11          Objective:   Vitals:  Blood pressure 136/81, pulse 62, temperature 97.2 °F (36.2 °C), resp. rate 20, weight 51.7 kg (114 lb).    Physical Examination:   GEN: no apparent distress, comfortable  HEAD: atraumatic and normocephalic  EYES: no pallor, no icterus  ENT:  no pharyngeal erythema, external ears WNL; no nasal  discharge; no thrush  NECK: no masses, thyroid normal, trachea midline, no LAD/LN's, supple  CV: RRR with no murmur; normal pulse; normal S1 and S2; no pedal edema  CHEST: Normal respiratory effort; CTAB; normal breath sounds; no wheeze or crackles  ABDOM: nontender and nondistended; soft;  no rebound/guarding, liver and spleen are not palpable, ileostomy is intact and appears functional.  MUSC/Skeletal: ROM normal; no crepitus; joints normal; no deformities  EXTREM: no clubbing, cyanosis, inflammation or swelling  SKIN: no rashes, lesions, ulcers, petechiae   : no cvat  NEURO: grossly intact; motor/sensory WNL; no tremors  PSYCH: normal mood, affect and behavior  LYMPH: normal cervical, supraclavicular, axillary and groin LN's  BREASTS: Nontender without palpable mass at the left or right breast    Labs:   Lab Results   Component Value Date    WBC 12.00 04/07/2018    HGB 12.9 04/07/2018    HCT 39.0 04/07/2018    MCV 92 04/07/2018     04/07/2018    CMP  Sodium   Date Value Ref Range Status   04/07/2018 137 136 - 145 mmol/L Final     Potassium   Date Value Ref Range Status   04/07/2018 4.0 3.5 - 5.1 mmol/L Final     Chloride   Date Value Ref Range Status   04/07/2018 102 95 - 110 mmol/L Final     CO2   Date Value Ref Range Status   04/07/2018 28 23 - 29 mmol/L Final     Glucose   Date Value Ref Range Status   04/07/2018 114 (H) 70 - 110 mg/dL Final     BUN, Bld   Date Value Ref Range Status   04/07/2018 7 6 - 20 mg/dL Final     Creatinine   Date Value Ref Range Status   04/07/2018 0.7 0.5 - 1.4 mg/dL Final     Calcium   Date Value Ref Range Status   04/07/2018 9.8 8.7 - 10.5 mg/dL Final     Total Protein   Date Value Ref Range Status   04/07/2018 8.0 6.0 - 8.4 g/dL Final     Albumin   Date Value Ref Range Status   04/07/2018 3.3 (L) 3.5 - 5.2 g/dL Final     Total Bilirubin   Date Value Ref Range Status   04/07/2018 0.5 0.1 - 1.0 mg/dL Final     Comment:     For infants and newborns, interpretation of results  should be based  on gestational age, weight and in agreement with clinical  observations.  Premature Infant recommended reference ranges:  Up to 24 hours.............<8.0 mg/dL  Up to 48 hours............<12.0 mg/dL  3-5 days..................<15.0 mg/dL  6-29 days.................<15.0 mg/dL       Alkaline Phosphatase   Date Value Ref Range Status   04/07/2018 134 55 - 135 U/L Final     AST   Date Value Ref Range Status   04/07/2018 80 (H) 10 - 40 U/L Final     ALT   Date Value Ref Range Status   04/07/2018 113 (H) 10 - 44 U/L Final     Anion Gap   Date Value Ref Range Status   04/07/2018 7 (L) 8 - 16 mmol/L Final     eGFR if    Date Value Ref Range Status   04/07/2018 >60 >60 mL/min/1.73 m^2 Final     eGFR if non    Date Value Ref Range Status   04/07/2018 >60 >60 mL/min/1.73 m^2 Final     Comment:     Calculation used to obtain the estimated glomerular filtration  rate (eGFR) is the CKD-EPI equation.        Lab per Dr. Flynn show vitamin D low at 24.8, creatinine normal at 0.5, B-12 normal at 1143, folate greater than 20, ferritin is normal at 89    Assessment:   (1) 53 y.o. female with diagnosis of pulmonary embolus ×2 and TIA ×1 .  She has history of heterozygous prothrombin gene mutation and homozygous   MTHFR mutation.  Status post IVC filter placement.    She is on Coumadin prophylaxis, 4 -5  mg by mouth as above , INR will be checked in 2 weeks.    (2) she has history of ulcerative colitis, status post proctocolectomy with ileostomy. 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  History of gastroparesis.    (3) right upper lobe nodule, under evaluation per Dr. Brush of pulmonary.  Bronchoscopy results negative thus far.    (4) homocysteine level NL  4.8. On multivitamen    Have discussed with her, that we could change her over to Xarelto, Eliquis or Pradaxa  Prophylaxis long-term and discontinue her  Coumadin. The dosage or administration of these medications would probably not be influenced by her diet, oral intake, or other medications. She is hesitant to come off her Coumadin and go to another drug.  She wants to stay on Coumadin.    RTC 6 months.

## 2019-08-05 ENCOUNTER — TELEPHONE (OUTPATIENT)
Dept: HEMATOLOGY/ONCOLOGY | Facility: CLINIC | Age: 54
End: 2019-08-05

## 2019-11-11 ENCOUNTER — TELEPHONE (OUTPATIENT)
Dept: HEMATOLOGY/ONCOLOGY | Facility: CLINIC | Age: 54
End: 2019-11-11

## 2019-11-11 NOTE — TELEPHONE ENCOUNTER
----- Message from Angely Yost sent at 11/8/2019  3:02 PM CST -----  The patient would like to get the results of her PT INR done yesterday at [facility]. Please call her back at [phone].

## 2019-12-23 ENCOUNTER — TELEPHONE (OUTPATIENT)
Dept: HEMATOLOGY/ONCOLOGY | Facility: CLINIC | Age: 54
End: 2019-12-23

## 2019-12-23 NOTE — TELEPHONE ENCOUNTER
----- Message from Laura Traylor sent at 12/23/2019  9:28 AM CST -----  Patient called the office requesting a return call from the nurse in regards to her PT INR results done last Thursday at [facility]. Please advise and contact patient at [phone]. Thanks

## 2020-02-06 ENCOUNTER — OFFICE VISIT (OUTPATIENT)
Dept: HEMATOLOGY/ONCOLOGY | Facility: CLINIC | Age: 55
End: 2020-02-06
Payer: MEDICARE

## 2020-02-06 VITALS
HEART RATE: 77 BPM | TEMPERATURE: 99 F | BODY MASS INDEX: 18.56 KG/M2 | SYSTOLIC BLOOD PRESSURE: 148 MMHG | WEIGHT: 115 LBS | DIASTOLIC BLOOD PRESSURE: 81 MMHG | RESPIRATION RATE: 18 BRPM

## 2020-02-06 DIAGNOSIS — Z15.89 HOMOZYGOUS MTHFR MUTATION C677T: ICD-10-CM

## 2020-02-06 DIAGNOSIS — Z86.711 HISTORY OF PULMONARY EMBOLUS (PE): Primary | ICD-10-CM

## 2020-02-06 PROCEDURE — 99214 OFFICE O/P EST MOD 30 MIN: CPT | Mod: S$GLB,,, | Performed by: INTERNAL MEDICINE

## 2020-02-06 PROCEDURE — 99214 PR OFFICE/OUTPT VISIT, EST, LEVL IV, 30-39 MIN: ICD-10-PCS | Mod: S$GLB,,, | Performed by: INTERNAL MEDICINE

## 2020-02-07 NOTE — PROGRESS NOTES
[facility] History & Physical    Subjective:      Patient ID:   Marianela Potter  54 y.o. female  1965  [facility]      Chief Complaint:   PE hx    HPI:  54 y.o. female with remote history of pulmonary embolus ×2.    Her past history is complex. She has history of ulcerative colitis and had proctocolectomy with ileostomy in 1995. She also had a cholecystectomy in 2005. This was followed by a complete hysterectomy in October 2012 for endometriosis. 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 She was also diagnosed with gastroparesis in 2008.    She has a history of iron deficiency anemia and has been given Ferrlicet infusions in the past in 5/2012.  She has taken Reglan intermittently to help with gastroparesis symptoms.    She has gastroparesis and intermittent dumping sx.  Recent EGD, gastritis seen, gastroparesis.    She is status post pulmonary embolus ×2, TIA ×1, and has a inferior vena cava filter in place.    Hypercoagulation evaluation was done in 2011. She is heterozygous positive for   Prothrombin gene mutation. She is homozygous positive for MT HFR mutation. She had been on Coumadin and aspirin at that time and Foltx was added also.    Recently she had pneumonia in 2018. A 9 mm nodule was found in the left upper lobe in June 2018. Recent PET scan showed slight uptake in the nodule. Bronchoscopy was done. Cultures are pending and are negative thus far, malignancy was not identified, and she is to follow-up with Dr. Brush of pulmonary for this.    She is presently on Coumadin  4 mg daily and recheck INR in 2 weeks. INR 1.9.    Mitral valve dx, fluttering sx in chest.    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    She has a history of allergy to morphine as manifested with itch. She does not smoke. She does not drink alcohol with regularity. She has worked at [employer] for several  years.    She has family history of hypertension and pulmonary embolus. She has a sister with breast cancer history.    ROS:   GEN: normal without any fever, night sweats or weight loss  HEENT: normal with no HA's, sore throat, stiff neck, changes in vision  CV: normal with no CP, SOB, PND, VERDUZCO or orthopnea  PULM: See history of present illness  GI: See history of present illness  : normal with no hematuria, dysuria  BREAST: normal with no mass, discharge, pain  SKIN: normal with no rash, erythema, bruising, or swelling         Review of patient's allergies indicates:   Allergen Reactions    Morphine            Current Outpatient Medications:     alprazolam (XANAX) 1 MG tablet, Take 2 mg by mouth nightly as needed for Anxiety., Disp: , Rfl:     hydrocodone-acetaminophen 10-325mg (NORCO)  mg Tab, Take by mouth., Disp: , Rfl:     lisinopril (PRINIVIL,ZESTRIL) 2.5 MG tablet, Take 2.5 mg by mouth once daily., Disp: , Rfl:     metoclopramide HCl (REGLAN) 10 MG tablet, Take 10 mg by mouth 4 (four) times daily., Disp: , Rfl:     pantoprazole (PROTONIX) 40 MG tablet, Take 40 mg by mouth once daily., Disp: , Rfl:     promethazine (PHENERGAN) 25 MG tablet, Take 25 mg by mouth daily as needed for Nausea., Disp: , Rfl:     warfarin (COUMADIN) 4 MG tablet, Take 4 mg by mouth Daily., Disp: , Rfl:     diclofenac sodium 1 % Gel, Apply 2 g topically 4 (four) times daily as needed., Disp: 100 g, Rfl: 0    lipase-protease-amylase 24,000-76,000-120,000 units (CREON) 24,000-76,000 -120,000 unit capsule, Take 1 capsule by mouth 3 (three) times daily with meals., Disp: 90 capsule, Rfl: 11          Objective:   Vitals:  Blood pressure (!) 148/81, pulse 77, temperature 98.6 °F (37 °C), temperature source Oral, resp. rate 18, weight 52.2 kg (115 lb).    Physical Examination:   GEN: no apparent distress, comfortable  HEAD: atraumatic and normocephalic  EYES: no pallor, no icterus  ENT:  no pharyngeal erythema, external ears  WNL; no nasal discharge; no thrush  NECK: no masses, thyroid normal, trachea midline, no LAD/LN's, supple  CV: RRR with no murmur; normal pulse; normal S1 and S2; no pedal edema  CHEST: Normal respiratory effort; CTAB; normal breath sounds; no wheeze or crackles  ABDOM: nontender and nondistended; soft;  no rebound/guarding, liver and spleen are not palpable, ileostomy is intact and appears functional.  MUSC/Skeletal: ROM normal; no crepitus; joints normal; no deformities  EXTREM: no clubbing, cyanosis, inflammation or swelling  SKIN: no rashes, lesions, ulcers, petechiae   : no cvat  NEURO: grossly intact; motor/sensory WNL; no tremors  PSYCH: normal mood, affect and behavior  LYMPH: normal cervical, supraclavicular, axillary and groin LN's  BREASTS: Nontender without palpable mass at the left or right breast    Labs:   Lab Results   Component Value Date    WBC 12.00 04/07/2018    HGB 12.9 04/07/2018    HCT 39.0 04/07/2018    MCV 92 04/07/2018     04/07/2018    CMP  Sodium   Date Value Ref Range Status   04/07/2018 137 136 - 145 mmol/L Final     Potassium   Date Value Ref Range Status   04/07/2018 4.0 3.5 - 5.1 mmol/L Final     Chloride   Date Value Ref Range Status   04/07/2018 102 95 - 110 mmol/L Final     CO2   Date Value Ref Range Status   04/07/2018 28 23 - 29 mmol/L Final     Glucose   Date Value Ref Range Status   04/07/2018 114 (H) 70 - 110 mg/dL Final     BUN, Bld   Date Value Ref Range Status   04/07/2018 7 6 - 20 mg/dL Final     Creatinine   Date Value Ref Range Status   04/07/2018 0.7 0.5 - 1.4 mg/dL Final     Calcium   Date Value Ref Range Status   04/07/2018 9.8 8.7 - 10.5 mg/dL Final     Total Protein   Date Value Ref Range Status   04/07/2018 8.0 6.0 - 8.4 g/dL Final     Albumin   Date Value Ref Range Status   04/07/2018 3.3 (L) 3.5 - 5.2 g/dL Final     Total Bilirubin   Date Value Ref Range Status   04/07/2018 0.5 0.1 - 1.0 mg/dL Final     Comment:     For infants and newborns,  interpretation of results should be based  on gestational age, weight and in agreement with clinical  observations.  Premature Infant recommended reference ranges:  Up to 24 hours.............<8.0 mg/dL  Up to 48 hours............<12.0 mg/dL  3-5 days..................<15.0 mg/dL  6-29 days.................<15.0 mg/dL       Alkaline Phosphatase   Date Value Ref Range Status   04/07/2018 134 55 - 135 U/L Final     AST   Date Value Ref Range Status   04/07/2018 80 (H) 10 - 40 U/L Final     ALT   Date Value Ref Range Status   04/07/2018 113 (H) 10 - 44 U/L Final     Anion Gap   Date Value Ref Range Status   04/07/2018 7 (L) 8 - 16 mmol/L Final     eGFR if    Date Value Ref Range Status   04/07/2018 >60 >60 mL/min/1.73 m^2 Final     eGFR if non    Date Value Ref Range Status   04/07/2018 >60 >60 mL/min/1.73 m^2 Final     Comment:     Calculation used to obtain the estimated glomerular filtration  rate (eGFR) is the CKD-EPI equation.        Lab per Dr. Flynn show vitamin D low at 24.8, creatinine normal at 0.5, B-12 normal at 1143, folate greater than 20, ferritin is normal at 89    Assessment:   (1) 54 y.o. female with diagnosis of pulmonary embolus ×2 and TIA ×1 .  She has history of heterozygous prothrombin gene mutation and homozygous   MTHFR mutation.  Status post IVC filter placement.    She is on Coumadin prophylaxis, 4  mg by mouth as above , INR will be checked in 2 weeks.    (2) she has history of ulcerative colitis, status post proctocolectomy with ileostomy. 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  History of gastroparesis.    (3) right upper lobe nodule, under evaluation per Dr. Brush of pulmonary.  Bronchoscopy results negative thus far.    (4) homocysteine level NL  4.8. On multivitamen    Have discussed with her, that we could change her over to Xarelto, Eliquis or Pradaxa  Prophylaxis long-term and  discontinue her Coumadin. The dosage or administration of these medications would probably not be influenced by her diet, oral intake, or other medications. She is hesitant to come off her Coumadin and go to another drug.  She wants to stay on Coumadin.    RTC 6 months.

## 2020-02-12 ENCOUNTER — TELEPHONE (OUTPATIENT)
Dept: HEMATOLOGY/ONCOLOGY | Facility: CLINIC | Age: 55
End: 2020-02-12

## 2020-08-04 ENCOUNTER — OFFICE VISIT (OUTPATIENT)
Dept: HEMATOLOGY/ONCOLOGY | Facility: CLINIC | Age: 55
End: 2020-08-04
Payer: MEDICARE

## 2020-08-04 VITALS
SYSTOLIC BLOOD PRESSURE: 165 MMHG | HEART RATE: 94 BPM | WEIGHT: 112.31 LBS | DIASTOLIC BLOOD PRESSURE: 100 MMHG | RESPIRATION RATE: 18 BRPM | TEMPERATURE: 99 F | BODY MASS INDEX: 18.13 KG/M2

## 2020-08-04 DIAGNOSIS — Z86.711 HISTORY OF PULMONARY EMBOLUS (PE): Primary | ICD-10-CM

## 2020-08-04 PROCEDURE — 99214 PR OFFICE/OUTPT VISIT, EST, LEVL IV, 30-39 MIN: ICD-10-PCS | Mod: S$GLB,,, | Performed by: INTERNAL MEDICINE

## 2020-08-04 PROCEDURE — 99214 OFFICE O/P EST MOD 30 MIN: CPT | Mod: S$GLB,,, | Performed by: INTERNAL MEDICINE

## 2020-08-15 NOTE — PROGRESS NOTES
[facility] hematology Oncology in office follow-up encounter progress note  August 4, 2020    Subjective:      Patient ID:   Marianela Potter  54 y.o. female  1965  [place]      Chief Complaint:   PE hx    HPI:  54 y.o. female with remote history of pulmonary embolus ×2.    Her past history is complex. She has history of ulcerative colitis and had proctocolectomy with ileostomy in 1995. She also had a cholecystectomy in 2005. This was followed by a complete hysterectomy in October 2012 for endometriosis. 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 She was also diagnosed with gastroparesis in 2008.   She is followed per Dr. Morrell.    She has a history of iron deficiency anemia and has been given Ferrlicet infusions in the past in 5/2012.  She has taken Reglan intermittently to help with gastroparesis symptoms.    She has gastroparesis and intermittent dumping sx.  Recent EGD, gastritis seen, gastroparesis.    She is status post pulmonary embolus ×2, TIA ×1, and has a inferior vena cava filter in place.    Hypercoagulation evaluation was done in 2011. She is heterozygous positive for   Prothrombin gene mutation. She is homozygous positive for MT HFR mutation. She had been on Coumadin and aspirin at that time and Foltx was added also.    Recently she had pneumonia in 2018. A 9 mm nodule was found in the left upper lobe in June 2018. Recent PET scan showed slight uptake in the nodule. Bronchoscopy was done. Cultures are pending and are negative thus far, malignancy was not identified, and she is to follow-up with Dr. Brush of pulmonary for this.  She is due for repeat CAT per pulmonary. 9/29/20.    She is presently on Coumadin  4 mg daily and recheck INR in 2 weeks. INR 1.9.    Mitral valve dx, fluttering sx in chest.    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    She has a history of  allergy to morphine as manifested with itch. She does not smoke. She does not drink alcohol with regularity. She has worked at [employer] for several years.    She has family history of hypertension and pulmonary embolus. She has a sister with breast cancer history.    ROS:   GEN: normal without any fever, night sweats or weight loss  HEENT: normal with no HA's, sore throat, stiff neck, changes in vision  CV: normal with no CP, SOB, PND, VERDUZCO or orthopnea  PULM: See history of present illness  GI: See history of present illness  : normal with no hematuria, dysuria  BREAST: normal with no mass, discharge, pain  SKIN: normal with no rash, erythema, bruising, or swelling         Review of patient's allergies indicates:   Allergen Reactions    Morphine            Current Outpatient Medications:     alprazolam (XANAX) 1 MG tablet, Take 2 mg by mouth nightly as needed for Anxiety., Disp: , Rfl:     hydrocodone-acetaminophen 10-325mg (NORCO)  mg Tab, Take by mouth., Disp: , Rfl:     metoclopramide HCl (REGLAN) 10 MG tablet, Take 10 mg by mouth 4 (four) times daily., Disp: , Rfl:     pantoprazole (PROTONIX) 40 MG tablet, Take 40 mg by mouth once daily., Disp: , Rfl:     warfarin (COUMADIN) 4 MG tablet, Take 4 mg by mouth Daily., Disp: , Rfl:     diclofenac sodium 1 % Gel, Apply 2 g topically 4 (four) times daily as needed., Disp: 100 g, Rfl: 0    lipase-protease-amylase 24,000-76,000-120,000 units (CREON) 24,000-76,000 -120,000 unit capsule, Take 1 capsule by mouth 3 (three) times daily with meals., Disp: 90 capsule, Rfl: 11    lisinopril (PRINIVIL,ZESTRIL) 2.5 MG tablet, Take 2.5 mg by mouth once daily., Disp: , Rfl:     promethazine (PHENERGAN) 25 MG tablet, Take 25 mg by mouth daily as needed for Nausea., Disp: , Rfl:           Objective:   Vitals:  Blood pressure (!) 165/100, pulse 94, temperature 98.6 °F (37 °C), resp. rate 18, weight 50.9 kg (112 lb 4.8 oz).    Physical Examination:   GEN: no apparent  distress, comfortable  HEAD: atraumatic and normocephalic  EYES: no pallor, no icterus  ENT:  no pharyngeal erythema, external ears WNL; no nasal discharge; no thrush  NECK: no masses, thyroid normal, trachea midline, no LAD/LN's, supple  CV: RRR with no murmur; normal pulse; normal S1 and S2; no pedal edema  CHEST: Normal respiratory effort; CTAB; normal breath sounds; no wheeze or crackles  ABDOM: nontender and nondistended; soft;  no rebound/guarding, liver and spleen are not palpable, ileostomy is intact and appears functional.  MUSC/Skeletal: ROM normal; no crepitus; joints normal; no deformities  EXTREM: no clubbing, cyanosis, inflammation or swelling  SKIN: no rashes, lesions, ulcers, petechiae   : no cvat  NEURO: grossly intact; motor/sensory WNL; no tremors  PSYCH: normal mood, affect and behavior  LYMPH: normal cervical, supraclavicular, axillary and groin LN's  BREASTS: Nontender without palpable mass at the left or right breast    Labs:   Lab Results   Component Value Date    WBC 12.00 04/07/2018    HGB 12.9 04/07/2018    HCT 39.0 04/07/2018    MCV 92 04/07/2018     04/07/2018    CMP  Sodium   Date Value Ref Range Status   04/07/2018 137 136 - 145 mmol/L Final     Potassium   Date Value Ref Range Status   04/07/2018 4.0 3.5 - 5.1 mmol/L Final     Chloride   Date Value Ref Range Status   04/07/2018 102 95 - 110 mmol/L Final     CO2   Date Value Ref Range Status   04/07/2018 28 23 - 29 mmol/L Final     Glucose   Date Value Ref Range Status   04/07/2018 114 (H) 70 - 110 mg/dL Final     BUN, Bld   Date Value Ref Range Status   04/07/2018 7 6 - 20 mg/dL Final     Creatinine   Date Value Ref Range Status   04/07/2018 0.7 0.5 - 1.4 mg/dL Final     Calcium   Date Value Ref Range Status   04/07/2018 9.8 8.7 - 10.5 mg/dL Final     Total Protein   Date Value Ref Range Status   04/07/2018 8.0 6.0 - 8.4 g/dL Final     Albumin   Date Value Ref Range Status   04/07/2018 3.3 (L) 3.5 - 5.2 g/dL Final     Total  Bilirubin   Date Value Ref Range Status   04/07/2018 0.5 0.1 - 1.0 mg/dL Final     Comment:     For infants and newborns, interpretation of results should be based  on gestational age, weight and in agreement with clinical  observations.  Premature Infant recommended reference ranges:  Up to 24 hours.............<8.0 mg/dL  Up to 48 hours............<12.0 mg/dL  3-5 days..................<15.0 mg/dL  6-29 days.................<15.0 mg/dL       Alkaline Phosphatase   Date Value Ref Range Status   04/07/2018 134 55 - 135 U/L Final     AST   Date Value Ref Range Status   04/07/2018 80 (H) 10 - 40 U/L Final     ALT   Date Value Ref Range Status   04/07/2018 113 (H) 10 - 44 U/L Final     Anion Gap   Date Value Ref Range Status   04/07/2018 7 (L) 8 - 16 mmol/L Final     eGFR if    Date Value Ref Range Status   04/07/2018 >60 >60 mL/min/1.73 m^2 Final     eGFR if non    Date Value Ref Range Status   04/07/2018 >60 >60 mL/min/1.73 m^2 Final     Comment:     Calculation used to obtain the estimated glomerular filtration  rate (eGFR) is the CKD-EPI equation.        Lab per Dr. Flynn show vitamin D low at 24.8, creatinine normal at 0.5, B-12 normal at 1143, folate greater than 20, ferritin is normal at 89    Assessment:   (1) 54 y.o. female with diagnosis of pulmonary embolus ×2 and TIA ×1 .  She has history of heterozygous prothrombin gene mutation and homozygous   MTHFR mutation.  Status post IVC filter placement.    She is on Coumadin prophylaxis, 4  mg by mouth as above , INR will be checked in 2 weeks.    (2) she has history of ulcerative colitis, status post proctocolectomy with ileostomy. 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  History of gastroparesis.    (3) right upper lobe nodule, under evaluation per Dr. Brush of pulmonary.  Bronchoscopy results negative thus far.  Due for repeat CT scan on September 29,  2020.    (4) homocysteine level NL  4.8. On multivitamen    Have discussed with her, that we could change her over to Xarelto, Eliquis or Pradaxa  Prophylaxis long-term and discontinue her Coumadin. The dosage or administration of these medications would probably not be influenced by her diet, oral intake, or other medications. She is hesitant to come off her Coumadin and go to another drug.  She wants to stay on Coumadin.    RTC 6 months.

## 2020-08-18 ENCOUNTER — TELEPHONE (OUTPATIENT)
Dept: HEMATOLOGY/ONCOLOGY | Facility: CLINIC | Age: 55
End: 2020-08-18

## 2020-08-18 NOTE — TELEPHONE ENCOUNTER
----- Message from Janet Coronado, Patient Care Assistant sent at 8/13/2020 11:46 AM CDT -----  Patient called in for lab results. She can be reached at [phone]

## 2020-11-12 ENCOUNTER — TELEPHONE (OUTPATIENT)
Dept: HEMATOLOGY/ONCOLOGY | Facility: CLINIC | Age: 55
End: 2020-11-12

## 2021-04-28 ENCOUNTER — HOSPITAL ENCOUNTER (OUTPATIENT)
Facility: HOSPITAL | Age: 56
Discharge: HOME | End: 2021-04-29
Attending: EMERGENCY MEDICINE | Admitting: HOSPITALIST
Payer: MEDICARE

## 2021-04-28 DIAGNOSIS — R55 VASOVAGAL SYNCOPE: ICD-10-CM

## 2021-04-28 DIAGNOSIS — R55 SYNCOPE: ICD-10-CM

## 2021-04-28 DIAGNOSIS — R47.1 DYSARTHRIA: Primary | ICD-10-CM

## 2021-04-28 PROBLEM — K31.84 GASTROPARESIS: Status: ACTIVE | Noted: 2021-04-28

## 2021-04-28 LAB
ANION GAP SERPL CALC-SCNC: 8 MMOL/L (ref 8–16)
BASOPHILS # BLD AUTO: 0.05 K/UL (ref 0–0.2)
BASOPHILS NFR BLD: 0.8 % (ref 0–1.9)
BUN SERPL-MCNC: 6 MG/DL (ref 6–20)
CALCIUM SERPL-MCNC: 9.3 MG/DL (ref 8.7–10.5)
CHLORIDE SERPL-SCNC: 106 MMOL/L (ref 95–110)
CO2 SERPL-SCNC: 26 MMOL/L (ref 23–29)
CREAT SERPL-MCNC: 0.7 MG/DL (ref 0.5–1.4)
DIFFERENTIAL METHOD: ABNORMAL
EOSINOPHIL # BLD AUTO: 0.1 K/UL (ref 0–0.5)
EOSINOPHIL NFR BLD: 0.9 % (ref 0–8)
ERYTHROCYTE [DISTWIDTH] IN BLOOD BY AUTOMATED COUNT: 12.3 % (ref 11.5–14.5)
EST. GFR  (AFRICAN AMERICAN): >60 ML/MIN/1.73 M^2
EST. GFR  (NON AFRICAN AMERICAN): >60 ML/MIN/1.73 M^2
GLUCOSE SERPL-MCNC: 97 MG/DL (ref 70–110)
HCT VFR BLD AUTO: 40.9 % (ref 37–48.5)
HGB BLD-MCNC: 13.2 G/DL (ref 12–16)
IMM GRANULOCYTES # BLD AUTO: 0.01 K/UL (ref 0–0.04)
IMM GRANULOCYTES NFR BLD AUTO: 0.2 % (ref 0–0.5)
INR PPP: 1.2 (ref 0.8–1.2)
LYMPHOCYTES # BLD AUTO: 1.4 K/UL (ref 1–4.8)
LYMPHOCYTES NFR BLD: 21.4 % (ref 18–48)
MCH RBC QN AUTO: 30.9 PG (ref 27–31)
MCHC RBC AUTO-ENTMCNC: 32.3 G/DL (ref 32–36)
MCV RBC AUTO: 96 FL (ref 82–98)
MONOCYTES # BLD AUTO: 0.5 K/UL (ref 0.3–1)
MONOCYTES NFR BLD: 7.4 % (ref 4–15)
NEUTROPHILS # BLD AUTO: 4.6 K/UL (ref 1.8–7.7)
NEUTROPHILS NFR BLD: 69.3 % (ref 38–73)
NRBC BLD-RTO: 0 /100 WBC
PLATELET # BLD AUTO: 263 K/UL (ref 150–450)
PMV BLD AUTO: 9.1 FL (ref 9.2–12.9)
POTASSIUM SERPL-SCNC: 4.4 MMOL/L (ref 3.5–5.1)
PROTHROMBIN TIME: 12 SEC (ref 9–12.5)
RBC # BLD AUTO: 4.27 M/UL (ref 4–5.4)
SARS-COV-2 RDRP RESP QL NAA+PROBE: NEGATIVE
SODIUM SERPL-SCNC: 140 MMOL/L (ref 136–145)
TROPONIN I SERPL DL<=0.01 NG/ML-MCNC: 0.01 NG/ML (ref 0–0.03)
TROPONIN I SERPL DL<=0.01 NG/ML-MCNC: <0.006 NG/ML (ref 0–0.03)
TROPONIN I SERPL DL<=0.01 NG/ML-MCNC: <0.006 NG/ML (ref 0–0.03)
WBC # BLD AUTO: 6.6 K/UL (ref 3.9–12.7)

## 2021-04-28 PROCEDURE — 99285 EMERGENCY DEPT VISIT HI MDM: CPT | Mod: 25

## 2021-04-28 PROCEDURE — 85025 COMPLETE CBC W/AUTO DIFF WBC: CPT | Performed by: EMERGENCY MEDICINE

## 2021-04-28 PROCEDURE — 93005 ELECTROCARDIOGRAM TRACING: CPT

## 2021-04-28 PROCEDURE — 93010 EKG 12-LEAD: ICD-10-PCS | Mod: ,,, | Performed by: SPECIALIST

## 2021-04-28 PROCEDURE — 84484 ASSAY OF TROPONIN QUANT: CPT | Performed by: EMERGENCY MEDICINE

## 2021-04-28 PROCEDURE — 63600175 PHARM REV CODE 636 W HCPCS: Performed by: HOSPITALIST

## 2021-04-28 PROCEDURE — 96372 THER/PROPH/DIAG INJ SC/IM: CPT

## 2021-04-28 PROCEDURE — G0378 HOSPITAL OBSERVATION PER HR: HCPCS

## 2021-04-28 PROCEDURE — 84484 ASSAY OF TROPONIN QUANT: CPT | Mod: 91 | Performed by: HOSPITALIST

## 2021-04-28 PROCEDURE — U0002 COVID-19 LAB TEST NON-CDC: HCPCS | Performed by: EMERGENCY MEDICINE

## 2021-04-28 PROCEDURE — 93010 ELECTROCARDIOGRAM REPORT: CPT | Mod: ,,, | Performed by: SPECIALIST

## 2021-04-28 PROCEDURE — 36415 COLL VENOUS BLD VENIPUNCTURE: CPT | Performed by: HOSPITALIST

## 2021-04-28 PROCEDURE — 36415 COLL VENOUS BLD VENIPUNCTURE: CPT | Performed by: EMERGENCY MEDICINE

## 2021-04-28 PROCEDURE — 85610 PROTHROMBIN TIME: CPT | Performed by: EMERGENCY MEDICINE

## 2021-04-28 PROCEDURE — 80048 BASIC METABOLIC PNL TOTAL CA: CPT | Performed by: EMERGENCY MEDICINE

## 2021-04-28 RX ORDER — WARFARIN 2 MG/1
4 TABLET ORAL DAILY
Status: DISCONTINUED | OUTPATIENT
Start: 2021-04-29 | End: 2021-04-29 | Stop reason: HOSPADM

## 2021-04-28 RX ORDER — ONDANSETRON 8 MG/1
8 TABLET, ORALLY DISINTEGRATING ORAL EVERY 8 HOURS PRN
Status: DISCONTINUED | OUTPATIENT
Start: 2021-04-28 | End: 2021-04-29 | Stop reason: HOSPADM

## 2021-04-28 RX ORDER — METOCLOPRAMIDE 10 MG/1
10 TABLET ORAL
Status: DISCONTINUED | OUTPATIENT
Start: 2021-04-28 | End: 2021-04-29 | Stop reason: HOSPADM

## 2021-04-28 RX ORDER — PANTOPRAZOLE SODIUM 40 MG/1
40 TABLET, DELAYED RELEASE ORAL DAILY
Status: DISCONTINUED | OUTPATIENT
Start: 2021-04-29 | End: 2021-04-29 | Stop reason: HOSPADM

## 2021-04-28 RX ORDER — ENOXAPARIN SODIUM 100 MG/ML
40 INJECTION SUBCUTANEOUS EVERY 24 HOURS
Status: DISCONTINUED | OUTPATIENT
Start: 2021-04-28 | End: 2021-04-29 | Stop reason: HOSPADM

## 2021-04-28 RX ORDER — ALPRAZOLAM 1 MG/1
2 TABLET ORAL NIGHTLY PRN
Status: DISCONTINUED | OUTPATIENT
Start: 2021-04-28 | End: 2021-04-29 | Stop reason: HOSPADM

## 2021-04-28 RX ORDER — SODIUM CHLORIDE 0.9 % (FLUSH) 0.9 %
10 SYRINGE (ML) INJECTION
Status: DISCONTINUED | OUTPATIENT
Start: 2021-04-28 | End: 2021-04-29 | Stop reason: HOSPADM

## 2021-04-28 RX ORDER — ACETAMINOPHEN 325 MG/1
650 TABLET ORAL EVERY 4 HOURS PRN
Status: DISCONTINUED | OUTPATIENT
Start: 2021-04-28 | End: 2021-04-29 | Stop reason: HOSPADM

## 2021-04-28 RX ADMIN — ENOXAPARIN SODIUM 40 MG: 40 INJECTION SUBCUTANEOUS at 08:04

## 2021-04-29 VITALS
DIASTOLIC BLOOD PRESSURE: 71 MMHG | BODY MASS INDEX: 18.49 KG/M2 | OXYGEN SATURATION: 99 % | WEIGHT: 111 LBS | HEART RATE: 67 BPM | SYSTOLIC BLOOD PRESSURE: 112 MMHG | HEIGHT: 65 IN | TEMPERATURE: 98 F | RESPIRATION RATE: 20 BRPM

## 2021-04-29 LAB
AORTIC ROOT ANNULUS: 2.75 CM
AV INDEX (PROSTH): 0.85
AV MEAN GRADIENT: 3 MMHG
AV PEAK GRADIENT: 6 MMHG
AV VALVE AREA: 2.6 CM2
AV VELOCITY RATIO: 0.76
BSA FOR ECHO PROCEDURE: 1.52 M2
CV ECHO LV RWT: 0.32 CM
DOP CALC AO PEAK VEL: 1.24 M/S
DOP CALC AO VTI: 21.08 CM
DOP CALC LVOT AREA: 3 CM2
DOP CALC LVOT DIAMETER: 1.97 CM
DOP CALC LVOT PEAK VEL: 0.94 M/S
DOP CALC LVOT STROKE VOLUME: 54.72 CM3
DOP CALCLVOT PEAK VEL VTI: 17.96 CM
E WAVE DECELERATION TIME: 226.54 MSEC
E/A RATIO: 1.08
E/E' RATIO: 8.24 M/S
ECHO LV POSTERIOR WALL: 0.59 CM (ref 0.6–1.1)
EJECTION FRACTION: 50 %
FRACTIONAL SHORTENING: 15 % (ref 28–44)
INR PPP: 1.2 (ref 0.8–1.2)
INTERVENTRICULAR SEPTUM: 0.65 CM (ref 0.6–1.1)
LA MAJOR: 3.2 CM
LA MINOR: 3.81 CM
LA WIDTH: 2.69 CM
LEFT ATRIUM SIZE: 3.53 CM
LEFT ATRIUM VOLUME INDEX: 18.2 ML/M2
LEFT ATRIUM VOLUME: 28.08 CM3
LEFT INTERNAL DIMENSION IN SYSTOLE: 3.13 CM (ref 2.1–4)
LEFT VENTRICLE DIASTOLIC VOLUME INDEX: 37.86 ML/M2
LEFT VENTRICLE DIASTOLIC VOLUME: 58.3 ML
LEFT VENTRICLE MASS INDEX: 38 G/M2
LEFT VENTRICLE SYSTOLIC VOLUME INDEX: 25.3 ML/M2
LEFT VENTRICLE SYSTOLIC VOLUME: 38.91 ML
LEFT VENTRICULAR INTERNAL DIMENSION IN DIASTOLE: 3.7 CM (ref 3.5–6)
LEFT VENTRICULAR MASS: 58.76 G
LV LATERAL E/E' RATIO: 7 M/S
LV SEPTAL E/E' RATIO: 10 M/S
MV MEAN GRADIENT: 1 MMHG
MV PEAK A VEL: 0.65 M/S
MV PEAK E VEL: 0.7 M/S
MV PEAK GRADIENT: 3 MMHG
MV STENOSIS PRESSURE HALF TIME: 74.01 MS
MV VALVE AREA P 1/2 METHOD: 2.97 CM2
PISA MRMAX VEL: 0.04 M/S
PISA TR MAX VEL: 1.97 M/S
PROTHROMBIN TIME: 12.1 SEC (ref 9–12.5)
PV PEAK VELOCITY: 0.88 CM/S
RA MAJOR: 3.79 CM
RA PRESSURE: 15 MMHG
RA WIDTH: 2.39 CM
RV TISSUE DOPPLER FREE WALL SYSTOLIC VELOCITY 1 (APICAL 4 CHAMBER VIEW): 12.06 CM/S
TDI LATERAL: 0.1 M/S
TDI SEPTAL: 0.07 M/S
TDI: 0.09 M/S
TR MAX PG: 16 MMHG
TRICUSPID ANNULAR PLANE SYSTOLIC EXCURSION: 1.56 CM
TROPONIN I SERPL DL<=0.01 NG/ML-MCNC: 0.01 NG/ML (ref 0–0.03)
TV REST PULMONARY ARTERY PRESSURE: 31 MMHG

## 2021-04-29 PROCEDURE — G0378 HOSPITAL OBSERVATION PER HR: HCPCS

## 2021-04-29 PROCEDURE — 84484 ASSAY OF TROPONIN QUANT: CPT | Performed by: HOSPITALIST

## 2021-04-29 PROCEDURE — 25000003 PHARM REV CODE 250: Performed by: HOSPITALIST

## 2021-04-29 PROCEDURE — 85610 PROTHROMBIN TIME: CPT | Performed by: HOSPITALIST

## 2021-04-29 PROCEDURE — 36415 COLL VENOUS BLD VENIPUNCTURE: CPT | Performed by: HOSPITALIST

## 2021-04-29 RX ADMIN — ALPRAZOLAM 2 MG: 1 TABLET ORAL at 12:04

## 2021-04-29 RX ADMIN — PANTOPRAZOLE SODIUM 40 MG: 40 TABLET, DELAYED RELEASE ORAL at 08:04

## 2021-05-03 ENCOUNTER — TELEPHONE (OUTPATIENT)
Dept: MEDSURG UNIT | Facility: HOSPITAL | Age: 56
End: 2021-05-03

## 2021-06-17 ENCOUNTER — OFFICE VISIT (OUTPATIENT)
Dept: HEMATOLOGY/ONCOLOGY | Facility: CLINIC | Age: 56
End: 2021-06-17
Payer: MEDICARE

## 2021-06-17 VITALS
TEMPERATURE: 98 F | SYSTOLIC BLOOD PRESSURE: 154 MMHG | WEIGHT: 112.81 LBS | HEART RATE: 80 BPM | BODY MASS INDEX: 18.77 KG/M2 | DIASTOLIC BLOOD PRESSURE: 98 MMHG

## 2021-06-17 DIAGNOSIS — Z86.711 HISTORY OF PULMONARY EMBOLUS (PE): Primary | ICD-10-CM

## 2021-06-17 PROCEDURE — 99214 PR OFFICE/OUTPT VISIT, EST, LEVL IV, 30-39 MIN: ICD-10-PCS | Mod: S$GLB,,, | Performed by: INTERNAL MEDICINE

## 2021-06-17 PROCEDURE — 99214 OFFICE O/P EST MOD 30 MIN: CPT | Mod: S$GLB,,, | Performed by: INTERNAL MEDICINE

## 2021-06-29 ENCOUNTER — TELEPHONE (OUTPATIENT)
Dept: HEMATOLOGY/ONCOLOGY | Facility: CLINIC | Age: 56
End: 2021-06-29

## 2021-08-11 ENCOUNTER — TELEPHONE (OUTPATIENT)
Dept: HEMATOLOGY/ONCOLOGY | Facility: CLINIC | Age: 56
End: 2021-08-11

## 2021-08-12 ENCOUNTER — TELEPHONE (OUTPATIENT)
Dept: HEMATOLOGY/ONCOLOGY | Facility: CLINIC | Age: 56
End: 2021-08-12

## 2021-08-29 ENCOUNTER — TELEPHONE (OUTPATIENT)
Dept: HEMATOLOGY/ONCOLOGY | Facility: CLINIC | Age: 56
End: 2021-08-29

## 2021-09-03 ENCOUNTER — TELEPHONE (OUTPATIENT)
Dept: HEMATOLOGY/ONCOLOGY | Facility: CLINIC | Age: 56
End: 2021-09-03

## 2021-09-03 DIAGNOSIS — Z86.711 HISTORY OF PULMONARY EMBOLUS (PE): Primary | ICD-10-CM

## 2021-09-03 DIAGNOSIS — Z15.89 HOMOZYGOUS MTHFR MUTATION C677T: ICD-10-CM

## 2021-09-16 ENCOUNTER — OFFICE VISIT (OUTPATIENT)
Dept: HEMATOLOGY/ONCOLOGY | Facility: CLINIC | Age: 56
End: 2021-09-16
Payer: MEDICARE

## 2021-09-16 ENCOUNTER — TELEPHONE (OUTPATIENT)
Dept: HEMATOLOGY/ONCOLOGY | Facility: CLINIC | Age: 56
End: 2021-09-16

## 2021-09-16 VITALS
HEART RATE: 109 BPM | BODY MASS INDEX: 18.67 KG/M2 | TEMPERATURE: 98 F | WEIGHT: 112.19 LBS | DIASTOLIC BLOOD PRESSURE: 90 MMHG | RESPIRATION RATE: 17 BRPM | SYSTOLIC BLOOD PRESSURE: 169 MMHG

## 2021-09-16 DIAGNOSIS — M54.9 MID BACK PAIN ON RIGHT SIDE: ICD-10-CM

## 2021-09-16 DIAGNOSIS — D68.52 PROTHROMBIN GENE MUTATION: ICD-10-CM

## 2021-09-16 DIAGNOSIS — K51.011 ULCERATIVE PANCOLITIS WITH RECTAL BLEEDING: ICD-10-CM

## 2021-09-16 DIAGNOSIS — Z86.711 HISTORY OF PULMONARY EMBOLUS (PE): Primary | ICD-10-CM

## 2021-09-16 DIAGNOSIS — R91.1 NODULE OF UPPER LOBE OF RIGHT LUNG: ICD-10-CM

## 2021-09-16 DIAGNOSIS — Z15.89 HOMOZYGOUS MTHFR MUTATION C677T: ICD-10-CM

## 2021-09-16 DIAGNOSIS — R55 VASOVAGAL SYNCOPE: ICD-10-CM

## 2021-09-16 DIAGNOSIS — Z90.49 HISTORY OF TOTAL COLECTOMY: ICD-10-CM

## 2021-09-16 DIAGNOSIS — Z95.828 S/P INSERTION OF IVC (INFERIOR VENA CAVAL) FILTER: ICD-10-CM

## 2021-09-16 DIAGNOSIS — K31.84 GASTROPARESIS: ICD-10-CM

## 2021-09-16 DIAGNOSIS — Z86.73 HISTORY OF TRANSIENT ISCHEMIC ATTACK (TIA): ICD-10-CM

## 2021-09-16 PROCEDURE — 99214 OFFICE O/P EST MOD 30 MIN: CPT | Mod: S$GLB,,, | Performed by: INTERNAL MEDICINE

## 2021-09-16 PROCEDURE — 99214 PR OFFICE/OUTPT VISIT, EST, LEVL IV, 30-39 MIN: ICD-10-PCS | Mod: S$GLB,,, | Performed by: INTERNAL MEDICINE

## 2021-10-20 ENCOUNTER — TELEPHONE (OUTPATIENT)
Dept: HEMATOLOGY/ONCOLOGY | Facility: CLINIC | Age: 56
End: 2021-10-20

## 2021-11-03 ENCOUNTER — TELEPHONE (OUTPATIENT)
Dept: HEMATOLOGY/ONCOLOGY | Facility: CLINIC | Age: 56
End: 2021-11-03
Payer: MEDICARE

## 2021-11-03 DIAGNOSIS — M54.9 BACK PAIN, UNSPECIFIED BACK LOCATION, UNSPECIFIED BACK PAIN LATERALITY, UNSPECIFIED CHRONICITY: Primary | ICD-10-CM

## 2021-11-10 ENCOUNTER — TELEPHONE (OUTPATIENT)
Dept: HEMATOLOGY/ONCOLOGY | Facility: CLINIC | Age: 56
End: 2021-11-10
Payer: MEDICARE

## 2022-01-09 ENCOUNTER — TELEPHONE (OUTPATIENT)
Dept: HEMATOLOGY/ONCOLOGY | Facility: HOSPITAL | Age: 57
End: 2022-01-09
Payer: MEDICARE

## 2022-02-13 ENCOUNTER — TELEPHONE (OUTPATIENT)
Dept: HEMATOLOGY/ONCOLOGY | Facility: CLINIC | Age: 57
End: 2022-02-13
Payer: MEDICARE

## 2022-03-09 ENCOUNTER — TELEPHONE (OUTPATIENT)
Dept: HEMATOLOGY/ONCOLOGY | Facility: CLINIC | Age: 57
End: 2022-03-09
Payer: MEDICARE

## 2022-03-09 NOTE — TELEPHONE ENCOUNTER
Pt states she had been sick with covid.  She does not want to Increase dose at this time. Will recheck 1 week.

## 2022-04-21 ENCOUNTER — TELEPHONE (OUTPATIENT)
Dept: HEMATOLOGY/ONCOLOGY | Facility: CLINIC | Age: 57
End: 2022-04-21

## 2022-04-21 DIAGNOSIS — Z86.711 HISTORY OF PULMONARY EMBOLUS (PE): Primary | ICD-10-CM

## 2022-04-22 RX ORDER — WARFARIN 4 MG/1
4 TABLET ORAL DAILY
Qty: 30 TABLET | Refills: 5 | Status: ON HOLD | OUTPATIENT
Start: 2022-04-22 | End: 2023-12-13 | Stop reason: HOSPADM

## 2022-04-22 NOTE — TELEPHONE ENCOUNTER
Spoke with pt, currently taking 4mg x5 days a week and 5mg x2 days a week.  Pt states that she has been running around 1.7 for the last couple times. She has been battling Omnicron and Influenza since January. She has been on multiple antibiotics and otc medications.  Not sure if any of these medications would effect her levels.  Benson

## 2022-05-04 ENCOUNTER — TELEPHONE (OUTPATIENT)
Dept: HEMATOLOGY/ONCOLOGY | Facility: CLINIC | Age: 57
End: 2022-05-04

## 2022-05-04 NOTE — TELEPHONE ENCOUNTER
INR came back at 1.3  I have contacted pt. She states that she believes the machine is off.  She is going to hospital today to get her labs drawn and will compare that and her machine level.

## 2022-05-06 ENCOUNTER — TELEPHONE (OUTPATIENT)
Dept: HEMATOLOGY/ONCOLOGY | Facility: CLINIC | Age: 57
End: 2022-05-06

## 2022-05-06 NOTE — TELEPHONE ENCOUNTER
Spoke with pt.  Her INR at [facility] came back at 1.4.  Her meter is readying 1.6 day after.  Pt to take 4 mg 5 mg 4 mg 5 mg and recheck 1 week. She verbalized understanding.

## 2022-05-15 ENCOUNTER — TELEPHONE (OUTPATIENT)
Dept: HEMATOLOGY/ONCOLOGY | Facility: CLINIC | Age: 57
End: 2022-05-15

## 2022-05-16 NOTE — TELEPHONE ENCOUNTER
INR is 1.5 from 5/12.    What dose of coumadin is she taking.?    I will need to adjust the dosage.

## 2022-05-27 ENCOUNTER — TELEPHONE (OUTPATIENT)
Dept: HEMATOLOGY/ONCOLOGY | Facility: CLINIC | Age: 57
End: 2022-05-27

## 2022-06-01 ENCOUNTER — TELEPHONE (OUTPATIENT)
Dept: HEMATOLOGY/ONCOLOGY | Facility: CLINIC | Age: 57
End: 2022-06-01

## 2022-06-01 NOTE — TELEPHONE ENCOUNTER
INR 1.2  Notified Dr. Trevizo  Pt had decreased dose to 5 mg 3 x a week and 4 mg on rest.  Pt to increase coumadin to 5 mg, 4mg rotating. Recheck 1 week. Pt verbalized understanding.

## 2022-06-09 ENCOUNTER — TELEPHONE (OUTPATIENT)
Dept: HEMATOLOGY/ONCOLOGY | Facility: HOSPITAL | Age: 57
End: 2022-06-09
Payer: MEDICARE

## 2022-06-10 NOTE — TELEPHONE ENCOUNTER
Confirm with her how she is taking the coumadin.    The chart shows 4,5,4,5, qod.    The inr returned 1.7.    If she confirms that schedule, then increase 5,5,4,repeat q 3 days.  Recheck INR 2 weeks.

## 2022-07-14 ENCOUNTER — TELEPHONE (OUTPATIENT)
Dept: HEMATOLOGY/ONCOLOGY | Facility: CLINIC | Age: 57
End: 2022-07-14

## 2022-09-18 ENCOUNTER — TELEPHONE (OUTPATIENT)
Dept: HEMATOLOGY/ONCOLOGY | Facility: HOSPITAL | Age: 57
End: 2022-09-18

## 2022-09-18 DIAGNOSIS — D68.52 PROTHROMBIN GENE MUTATION: Primary | ICD-10-CM

## 2022-09-18 DIAGNOSIS — Z15.89 HOMOZYGOUS MTHFR MUTATION C677T: ICD-10-CM

## 2022-09-19 NOTE — TELEPHONE ENCOUNTER
INR is 2.6 from 9/14/22.    What dose of coumadin is she taking now?    Continue the same dose.    Recheck inr 2 weeks.

## 2022-09-19 NOTE — TELEPHONE ENCOUNTER
Pt checked inr at home x 2 today.  Readings came back at 7.4 and 6.9.  I instructed pt to hold Coumadin at this time and go to [place] for a recheck.  I will fax orders to .  Pt states she has noticed some blood when she blows her nose.  Pt also states she has had major flank pain. She went to ER for flank pain and they told her no kidney stones or anything that they see that could cause the pain. Pt still having a lot of pain. I will let dr moreno know and will await results from INR today.

## 2022-09-20 ENCOUNTER — TELEPHONE (OUTPATIENT)
Dept: HEMATOLOGY/ONCOLOGY | Facility: CLINIC | Age: 57
End: 2022-09-20

## 2022-09-20 NOTE — TELEPHONE ENCOUNTER
----- Message from Mireille Apple RN sent at 9/19/2022  3:40 PM CDT -----  Hey I was called with a critical INR for this pt of 7.0

## 2022-09-21 ENCOUNTER — TELEPHONE (OUTPATIENT)
Dept: HEMATOLOGY/ONCOLOGY | Facility: CLINIC | Age: 57
End: 2022-09-21

## 2022-09-21 NOTE — TELEPHONE ENCOUNTER
----- Message from Angely Yost sent at 9/21/2022  1:09 PM CDT -----  The patient called and said her PT is 57.4 and her INR is 5.6 taken today about an hour ago. She has been holding her Warfarin and wants to know what she needs to do. # [phone]

## 2022-09-23 ENCOUNTER — TELEPHONE (OUTPATIENT)
Dept: HEMATOLOGY/ONCOLOGY | Facility: CLINIC | Age: 57
End: 2022-09-23

## 2022-09-23 NOTE — TELEPHONE ENCOUNTER
----- Message from Torri Miller sent at 9/22/2022  3:52 PM CDT -----  Regarding: ATTN: Debra   INR levels 3.3 and PT 35.3

## 2022-09-23 NOTE — TELEPHONE ENCOUNTER
Informed pt of Inr results. Instructed to resume 4mg daily dose and and to recheck blood twice a week on Monday and thursdays. Verbalized understanding.

## 2022-10-19 ENCOUNTER — TELEPHONE (OUTPATIENT)
Dept: HEMATOLOGY/ONCOLOGY | Facility: CLINIC | Age: 57
End: 2022-10-19

## 2022-11-01 ENCOUNTER — TELEPHONE (OUTPATIENT)
Dept: HEMATOLOGY/ONCOLOGY | Facility: CLINIC | Age: 57
End: 2022-11-01

## 2022-11-01 NOTE — TELEPHONE ENCOUNTER
Notified that patient's INR was 1.2 per Paras from [facility] INR. Dr. Trevizo made aware that and per his verbal order patient to take 4 mg and 5 mg of coumadin on alternating days and to recheck her INR in 1 week. Called back to notify of above, no answer at number listed. Voicemail left to return call.

## 2022-11-17 ENCOUNTER — TELEPHONE (OUTPATIENT)
Dept: HEMATOLOGY/ONCOLOGY | Facility: CLINIC | Age: 57
End: 2022-11-17

## 2022-11-17 NOTE — TELEPHONE ENCOUNTER
Pt stated that most recent INR 1.7. She had missed a couple days and has been taking 4mg the last couple days. Will recheck Monday and call with results.   Verbalized understanding.

## 2022-11-22 ENCOUNTER — TELEPHONE (OUTPATIENT)
Dept: HEMATOLOGY/ONCOLOGY | Facility: CLINIC | Age: 57
End: 2022-11-22

## 2022-11-22 NOTE — TELEPHONE ENCOUNTER
----- Message from Nora Villarreal sent at 11/21/2022 11:57 AM CST -----  Regarding: pt/inr  Patient called and stated her PT/INR from today is 2.2 and 23.6 . Taking 4mg daily. Please call with instructions.    # [phone]

## 2022-12-15 ENCOUNTER — TELEPHONE (OUTPATIENT)
Dept: HEMATOLOGY/ONCOLOGY | Facility: CLINIC | Age: 57
End: 2022-12-15

## 2022-12-15 NOTE — TELEPHONE ENCOUNTER
Notified pt INR 1.9. Current Dose 4mg daily. Instructed to alternate 4 and 5 mg daily. Recheck in 1 week. Verbalized understanding.

## 2023-01-02 ENCOUNTER — TELEPHONE (OUTPATIENT)
Dept: HEMATOLOGY/ONCOLOGY | Facility: CLINIC | Age: 58
End: 2023-01-02

## 2023-01-03 NOTE — TELEPHONE ENCOUNTER
INR on recent lab was 1.9.  Ask her has she missed doses of coumadin.  And how much has she been taking?

## 2023-01-22 ENCOUNTER — TELEPHONE (OUTPATIENT)
Dept: HEMATOLOGY/ONCOLOGY | Facility: CLINIC | Age: 58
End: 2023-01-22

## 2023-01-23 ENCOUNTER — TELEPHONE (OUTPATIENT)
Dept: HEMATOLOGY/ONCOLOGY | Facility: CLINIC | Age: 58
End: 2023-01-23

## 2023-01-23 NOTE — TELEPHONE ENCOUNTER
Dr Trevizo reviewed patient's report of INR of 2.0. Per his verbal order, patient to continue her current dose of 5 mg on Tuesday and Thursday and and 4 mg on Sunday, Monday, Wednesday, Friday, Saturday. Recheck in 2 weeks. Patient verbalized understanding of above.

## 2023-02-26 ENCOUNTER — TELEPHONE (OUTPATIENT)
Dept: HEMATOLOGY/ONCOLOGY | Facility: CLINIC | Age: 58
End: 2023-02-26

## 2023-02-27 NOTE — TELEPHONE ENCOUNTER
Dr Trevizo reviewed patient's INR of 1.9. Per his verbal order, patient to continue coumadin at 5 mg on Tuesday and Thursday and 4 mg on Sunday, Monday, Wednesday, Friday, Saturday. Recheck in 1 week. Patient reports she's actually due to be checked today. Patient to call later with her INR result. Patient verbalized understanding of above.

## 2023-02-28 ENCOUNTER — TELEPHONE (OUTPATIENT)
Dept: HEMATOLOGY/ONCOLOGY | Facility: CLINIC | Age: 58
End: 2023-02-28

## 2023-02-28 NOTE — TELEPHONE ENCOUNTER
Dr Trevizo reviewed patient's INR of 2.1. Per his verbal order, patient to continue current dose of 4 mg on Sunday, Monday, Wednesday, Friday, Saturday. 5 mg Tuesday, Thursday, recheck in 1 week. Patient verbalized understanding of above.

## 2023-03-29 ENCOUNTER — TELEPHONE (OUTPATIENT)
Dept: HEMATOLOGY/ONCOLOGY | Facility: CLINIC | Age: 58
End: 2023-03-29

## 2023-03-29 NOTE — TELEPHONE ENCOUNTER
Patient resting quiet to self at this time, respirations are even and unlabored, no signs or symptoms of distress or discomfort. PRN medications given this shift for sleep and anxiety. Staff will continue to conduct environmental rounds to ensure the safety of everyone on the unit. Staff will provide support and interventions as requested or required. Patient returned call and above reviewed with her. She verbalized understanding.

## 2023-03-29 NOTE — TELEPHONE ENCOUNTER
Dr Trevizo reviewed patient's INR of 2.0 - per his verbal order, patient to continue current dose of coumadin at 4 mg on Sunday, Monday, Wednesday, Friday, Saturday, and 5 mg on Tuesday and Thursday. Recheck INR weekly. Attempted to call patient with above, no answer at number listed. Voicemail left with above and instructions to call back with any questions.

## 2023-04-19 ENCOUNTER — TELEPHONE (OUTPATIENT)
Dept: HEMATOLOGY/ONCOLOGY | Facility: CLINIC | Age: 58
End: 2023-04-19

## 2023-05-16 ENCOUNTER — TELEPHONE (OUTPATIENT)
Dept: HEMATOLOGY/ONCOLOGY | Facility: CLINIC | Age: 58
End: 2023-05-16

## 2023-05-16 NOTE — TELEPHONE ENCOUNTER
Dr Trevizo reviewed patient's recent INR of 1.9 and per his verbal order, patient to continue her current dose of coumadin and recheck INR as scheduled.

## 2023-05-26 ENCOUNTER — TELEPHONE (OUTPATIENT)
Dept: HEMATOLOGY/ONCOLOGY | Facility: CLINIC | Age: 58
End: 2023-05-26

## 2023-05-29 ENCOUNTER — TELEPHONE (OUTPATIENT)
Dept: HEMATOLOGY/ONCOLOGY | Facility: CLINIC | Age: 58
End: 2023-05-29

## 2023-05-29 NOTE — TELEPHONE ENCOUNTER
Dr Trevizo reviewed patient's INR of 2.1 and per his verbal order, patient to continue current dose of coumadin and recheck INR in 1 week. Attempted to call patient with above, no answer at number listed. Unable to leave voicemail at this time.

## 2023-06-23 ENCOUNTER — TELEPHONE (OUTPATIENT)
Dept: HEMATOLOGY/ONCOLOGY | Facility: HOSPITAL | Age: 58
End: 2023-06-23

## 2023-06-23 NOTE — TELEPHONE ENCOUNTER
Dr Trevizo reviewed patient's INR of 2.2 and per his verbal order, patient to continue coumadin at current dose of 4mg on Sun, Mon, Wed, Fri, Sat, and 5 mg on Tues & Thurs, recheck INR in 1 week. Patient verbalized understanding of above.

## 2023-07-13 ENCOUNTER — TELEPHONE (OUTPATIENT)
Dept: HEMATOLOGY/ONCOLOGY | Facility: CLINIC | Age: 58
End: 2023-07-13

## 2023-07-13 NOTE — TELEPHONE ENCOUNTER
Enma Mac NP-C reviewed patient's INR of 1.9 and per her verbal order, patient to continue coumadin at current dose of 4mg on Sun, Mon, Wed, Fri, Sat, and 5 mg on Tues & Thurs, recheck INR in 1 week. Patient verbalized understanding of above.

## 2023-07-29 ENCOUNTER — TELEPHONE (OUTPATIENT)
Dept: HEMATOLOGY/ONCOLOGY | Facility: CLINIC | Age: 58
End: 2023-07-29

## 2023-07-31 NOTE — TELEPHONE ENCOUNTER
Dr Trevizo reviewed patient's INR of 2.2 and per his verbal order, patient to continue currenty coumadin dose of 4mg on Sun, Mon, Wed, Fri, Sat, and 5 mg on Tue, Thurs. Attempted to call and make patient aware of above. No answer at number listed. Voicemail left with above and instructions to call back with any questions.

## 2023-08-17 ENCOUNTER — TELEPHONE (OUTPATIENT)
Dept: HEMATOLOGY/ONCOLOGY | Facility: CLINIC | Age: 58
End: 2023-08-17

## 2023-08-17 NOTE — TELEPHONE ENCOUNTER
Enma Mac NP-C reviewed patient's INR of 1.7 and per her verbal order, patient to increase her coumadin to 4mg and 5mg alternated every other day and recheck INR in 1 week. Patient made aware of above and verbalized understanding.

## 2023-08-25 ENCOUNTER — TELEPHONE (OUTPATIENT)
Dept: HEMATOLOGY/ONCOLOGY | Facility: CLINIC | Age: 58
End: 2023-08-25

## 2023-08-25 NOTE — TELEPHONE ENCOUNTER
Dr Trevizo reviewed patient's INR of 1.9 and per his verbal order, patient to increase her coumadin dose to 4mg on Sun, Mon, Wed, Fri, and 5mg on Tues, Thurs, Sat, and recheck INR weekly as scheduled. Attempted to call patient with above, no answer at number listed. Voicemail left with instructions above and instructions to call back with any questions.

## 2023-09-14 ENCOUNTER — OFFICE VISIT (OUTPATIENT)
Dept: HEMATOLOGY/ONCOLOGY | Facility: CLINIC | Age: 58
End: 2023-09-14
Payer: MEDICARE

## 2023-09-14 ENCOUNTER — LAB VISIT (OUTPATIENT)
Dept: LAB | Facility: HOSPITAL | Age: 58
End: 2023-09-14
Attending: INTERNAL MEDICINE
Payer: MEDICARE

## 2023-09-14 VITALS
BODY MASS INDEX: 18.53 KG/M2 | TEMPERATURE: 98 F | HEIGHT: 66 IN | DIASTOLIC BLOOD PRESSURE: 84 MMHG | WEIGHT: 115.31 LBS | HEART RATE: 83 BPM | SYSTOLIC BLOOD PRESSURE: 152 MMHG | RESPIRATION RATE: 16 BRPM

## 2023-09-14 DIAGNOSIS — E46 PROTEIN-CALORIE MALNUTRITION, UNSPECIFIED SEVERITY: ICD-10-CM

## 2023-09-14 DIAGNOSIS — R73.03 PREDIABETES: ICD-10-CM

## 2023-09-14 DIAGNOSIS — D53.8 OTHER SPECIFIED NUTRITIONAL ANEMIAS: ICD-10-CM

## 2023-09-14 DIAGNOSIS — Z15.89 HOMOZYGOUS MTHFR MUTATION C677T: ICD-10-CM

## 2023-09-14 DIAGNOSIS — K51.011 ULCERATIVE PANCOLITIS WITH RECTAL BLEEDING: ICD-10-CM

## 2023-09-14 DIAGNOSIS — D52.0 DIETARY FOLATE DEFICIENCY ANEMIA: ICD-10-CM

## 2023-09-14 DIAGNOSIS — D68.52 PROTHROMBIN GENE MUTATION: ICD-10-CM

## 2023-09-14 DIAGNOSIS — K51.011 ULCERATIVE PANCOLITIS WITH RECTAL BLEEDING: Primary | ICD-10-CM

## 2023-09-14 LAB
ALBUMIN SERPL BCP-MCNC: 4.2 G/DL (ref 3.5–5.2)
ALP SERPL-CCNC: 67 U/L (ref 55–135)
ALT SERPL W/O P-5'-P-CCNC: 20 U/L (ref 10–44)
ANION GAP SERPL CALC-SCNC: 4 MMOL/L (ref 8–16)
AST SERPL-CCNC: 24 U/L (ref 10–40)
BASOPHILS # BLD AUTO: 0.05 K/UL (ref 0–0.2)
BASOPHILS NFR BLD: 0.9 % (ref 0–1.9)
BILIRUB SERPL-MCNC: 0.6 MG/DL (ref 0.1–1)
BUN SERPL-MCNC: 10 MG/DL (ref 6–20)
CALCIUM SERPL-MCNC: 9.2 MG/DL (ref 8.7–10.5)
CHLORIDE SERPL-SCNC: 106 MMOL/L (ref 95–110)
CO2 SERPL-SCNC: 30 MMOL/L (ref 23–29)
CREAT SERPL-MCNC: 0.7 MG/DL (ref 0.5–1.4)
DIFFERENTIAL METHOD: ABNORMAL
EOSINOPHIL # BLD AUTO: 0 K/UL (ref 0–0.5)
EOSINOPHIL NFR BLD: 0.7 % (ref 0–8)
ERYTHROCYTE [DISTWIDTH] IN BLOOD BY AUTOMATED COUNT: 12.3 % (ref 11.5–14.5)
EST. GFR  (NO RACE VARIABLE): >60 ML/MIN/1.73 M^2
FERRITIN SERPL-MCNC: 61 NG/ML (ref 20–300)
FOLATE SERPL-MCNC: >24.8 NG/ML (ref 4–24)
GLUCOSE SERPL-MCNC: 83 MG/DL (ref 70–110)
HCT VFR BLD AUTO: 42.5 % (ref 37–48.5)
HGB BLD-MCNC: 13.8 G/DL (ref 12–16)
IMM GRANULOCYTES # BLD AUTO: 0.01 K/UL (ref 0–0.04)
IMM GRANULOCYTES NFR BLD AUTO: 0.2 % (ref 0–0.5)
LYMPHOCYTES # BLD AUTO: 1.8 K/UL (ref 1–4.8)
LYMPHOCYTES NFR BLD: 30.6 % (ref 18–48)
MCH RBC QN AUTO: 30.5 PG (ref 27–31)
MCHC RBC AUTO-ENTMCNC: 32.5 G/DL (ref 32–36)
MCV RBC AUTO: 94 FL (ref 82–98)
MONOCYTES # BLD AUTO: 0.6 K/UL (ref 0.3–1)
MONOCYTES NFR BLD: 10.3 % (ref 4–15)
NEUTROPHILS # BLD AUTO: 3.3 K/UL (ref 1.8–7.7)
NEUTROPHILS NFR BLD: 57.3 % (ref 38–73)
NRBC BLD-RTO: 0 /100 WBC
PLATELET # BLD AUTO: 269 K/UL (ref 150–450)
PMV BLD AUTO: 8.9 FL (ref 9.2–12.9)
POTASSIUM SERPL-SCNC: 3.9 MMOL/L (ref 3.5–5.1)
PROT SERPL-MCNC: 7.7 G/DL (ref 6–8.4)
RBC # BLD AUTO: 4.53 M/UL (ref 4–5.4)
SODIUM SERPL-SCNC: 140 MMOL/L (ref 136–145)
TSH SERPL DL<=0.005 MIU/L-ACNC: 1.53 UIU/ML (ref 0.34–5.6)
VIT B12 SERPL-MCNC: 472 PG/ML (ref 210–950)
WBC # BLD AUTO: 5.71 K/UL (ref 3.9–12.7)

## 2023-09-14 PROCEDURE — 80053 COMPREHEN METABOLIC PANEL: CPT | Performed by: INTERNAL MEDICINE

## 2023-09-14 PROCEDURE — 82607 VITAMIN B-12: CPT | Performed by: INTERNAL MEDICINE

## 2023-09-14 PROCEDURE — 82728 ASSAY OF FERRITIN: CPT | Performed by: INTERNAL MEDICINE

## 2023-09-14 PROCEDURE — 85025 COMPLETE CBC W/AUTO DIFF WBC: CPT | Performed by: INTERNAL MEDICINE

## 2023-09-14 PROCEDURE — 84443 ASSAY THYROID STIM HORMONE: CPT | Performed by: INTERNAL MEDICINE

## 2023-09-14 PROCEDURE — 36415 COLL VENOUS BLD VENIPUNCTURE: CPT | Performed by: INTERNAL MEDICINE

## 2023-09-14 PROCEDURE — 99214 PR OFFICE/OUTPT VISIT, EST, LEVL IV, 30-39 MIN: ICD-10-PCS | Mod: S$GLB,,, | Performed by: INTERNAL MEDICINE

## 2023-09-14 PROCEDURE — 99214 OFFICE O/P EST MOD 30 MIN: CPT | Mod: S$GLB,,, | Performed by: INTERNAL MEDICINE

## 2023-09-14 PROCEDURE — 82746 ASSAY OF FOLIC ACID SERUM: CPT | Performed by: INTERNAL MEDICINE
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
## 2023-09-22 ENCOUNTER — TELEPHONE (OUTPATIENT)
Dept: HEMATOLOGY/ONCOLOGY | Facility: CLINIC | Age: 58
End: 2023-09-22

## 2023-09-22 NOTE — TELEPHONE ENCOUNTER
Dr Trevizo reviewed patient's INR of 2.2 and per his verbal order, patient to continue current dose of 4mg on Sun, Mon, Wed, Fri, Sat, and 5mg on Tues and Thurs, recheck INR in 1 week. Patient made aware of above and verbalized understanding.

## 2023-09-30 ENCOUNTER — TELEPHONE (OUTPATIENT)
Dept: HEMATOLOGY/ONCOLOGY | Facility: CLINIC | Age: 58
End: 2023-09-30

## 2023-10-09 ENCOUNTER — TELEPHONE (OUTPATIENT)
Dept: HEMATOLOGY/ONCOLOGY | Facility: CLINIC | Age: 58
End: 2023-10-09

## 2023-11-03 ENCOUNTER — TELEPHONE (OUTPATIENT)
Dept: HEMATOLOGY/ONCOLOGY | Facility: CLINIC | Age: 58
End: 2023-11-03

## 2023-11-03 NOTE — TELEPHONE ENCOUNTER
Called patient to go over new instructions for her Coumadin dosage because her INR was 1.9 on 10/26/23. Pt staid that she would like to take her labs over because when those labs were taken she was out of town and was out of her medicine and she is now back to taking her regular dosage and would like to retake her INR and call us back. Advised would be here til 4pm. Pt verbalized understanding.

## 2023-11-03 NOTE — TELEPHONE ENCOUNTER
Pt called to say that she just checked her INR and she is at 2.0. The range is 2-3. No adjustments to her meds. Pt verbalized understanding.

## 2023-11-07 ENCOUNTER — TELEPHONE (OUTPATIENT)
Dept: HEMATOLOGY/ONCOLOGY | Facility: CLINIC | Age: 58
End: 2023-11-07

## 2023-11-25 ENCOUNTER — TELEPHONE (OUTPATIENT)
Dept: HEMATOLOGY/ONCOLOGY | Facility: CLINIC | Age: 58
End: 2023-11-25

## 2023-12-11 ENCOUNTER — TELEPHONE (OUTPATIENT)
Dept: HEMATOLOGY/ONCOLOGY | Facility: CLINIC | Age: 58
End: 2023-12-11

## 2023-12-11 ENCOUNTER — HOSPITAL ENCOUNTER (INPATIENT)
Facility: HOSPITAL | Age: 58
LOS: 2 days | Discharge: HOME | DRG: 813 | End: 2023-12-13
Attending: EMERGENCY MEDICINE | Admitting: STUDENT IN AN ORGANIZED HEALTH CARE EDUCATION/TRAINING PROGRAM
Payer: MEDICARE

## 2023-12-11 DIAGNOSIS — H93.19 TINNITUS, UNSPECIFIED LATERALITY: ICD-10-CM

## 2023-12-11 DIAGNOSIS — R51.9 INTRACTABLE HEADACHE, UNSPECIFIED CHRONICITY PATTERN, UNSPECIFIED HEADACHE TYPE: ICD-10-CM

## 2023-12-11 DIAGNOSIS — S06.5XAA BILATERAL SUBDURAL HEMATOMAS: ICD-10-CM

## 2023-12-11 DIAGNOSIS — R00.0 TACHYCARDIA: ICD-10-CM

## 2023-12-11 DIAGNOSIS — S06.5XAA SUBDURAL HEMATOMA: ICD-10-CM

## 2023-12-11 DIAGNOSIS — Z86.711 HISTORY OF PULMONARY EMBOLUS (PE): ICD-10-CM

## 2023-12-11 DIAGNOSIS — I62.00 NONTRAUMATIC SUBDURAL HEMORRHAGE, UNSPECIFIED: ICD-10-CM

## 2023-12-11 DIAGNOSIS — S06.5XAA SDH (SUBDURAL HEMATOMA): ICD-10-CM

## 2023-12-11 DIAGNOSIS — I62.00 NON-TRAUMATIC SUBDURAL HEMATOMA (SDH): Primary | ICD-10-CM

## 2023-12-11 PROBLEM — K21.9 GERD (GASTROESOPHAGEAL REFLUX DISEASE): Status: ACTIVE | Noted: 2023-12-11

## 2023-12-11 PROBLEM — I10 HYPERTENSION: Status: ACTIVE | Noted: 2023-12-11

## 2023-12-11 LAB
ALBUMIN SERPL BCP-MCNC: 4.2 G/DL (ref 3.5–5.2)
ALP SERPL-CCNC: 74 U/L (ref 55–135)
ALT SERPL W/O P-5'-P-CCNC: 14 U/L (ref 10–44)
ANION GAP SERPL CALC-SCNC: 13 MMOL/L (ref 8–16)
APTT PPP: 28.2 SEC (ref 21–32)
AST SERPL-CCNC: 23 U/L (ref 10–40)
BACTERIA #/AREA URNS HPF: NEGATIVE /HPF
BASOPHILS # BLD AUTO: 0.05 K/UL (ref 0–0.2)
BASOPHILS NFR BLD: 0.8 % (ref 0–1.9)
BILIRUB SERPL-MCNC: 0.4 MG/DL (ref 0.1–1)
BILIRUB UR QL STRIP: NEGATIVE
BUN SERPL-MCNC: 10 MG/DL (ref 6–20)
CALCIUM SERPL-MCNC: 8.7 MG/DL (ref 8.7–10.5)
CHLORIDE SERPL-SCNC: 102 MMOL/L (ref 95–110)
CLARITY UR: CLEAR
CO2 SERPL-SCNC: 27 MMOL/L (ref 23–29)
COLOR UR: COLORLESS
CREAT SERPL-MCNC: 0.7 MG/DL (ref 0.5–1.4)
DIFFERENTIAL METHOD BLD: ABNORMAL
EOSINOPHIL # BLD AUTO: 0.1 K/UL (ref 0–0.5)
EOSINOPHIL NFR BLD: 0.9 % (ref 0–8)
ERYTHROCYTE [DISTWIDTH] IN BLOOD BY AUTOMATED COUNT: 12.1 % (ref 11.5–14.5)
EST. GFR  (NO RACE VARIABLE): >60 ML/MIN/1.73 M^2
GLUCOSE SERPL-MCNC: 94 MG/DL (ref 70–110)
GLUCOSE UR QL STRIP: NEGATIVE
HCT VFR BLD AUTO: 37.5 % (ref 37–48.5)
HGB BLD-MCNC: 12.5 G/DL (ref 12–16)
HGB UR QL STRIP: ABNORMAL
HYALINE CASTS #/AREA URNS LPF: 0 /LPF
IMM GRANULOCYTES # BLD AUTO: 0.02 K/UL (ref 0–0.04)
IMM GRANULOCYTES NFR BLD AUTO: 0.3 % (ref 0–0.5)
INR PPP: 2.3 (ref 0.8–1.2)
KETONES UR QL STRIP: NEGATIVE
LEUKOCYTE ESTERASE UR QL STRIP: NEGATIVE
LYMPHOCYTES # BLD AUTO: 1.5 K/UL (ref 1–4.8)
LYMPHOCYTES NFR BLD: 22.8 % (ref 18–48)
MCH RBC QN AUTO: 31.3 PG (ref 27–31)
MCHC RBC AUTO-ENTMCNC: 33.3 G/DL (ref 32–36)
MCV RBC AUTO: 94 FL (ref 82–98)
MICROSCOPIC COMMENT: ABNORMAL
MONOCYTES # BLD AUTO: 0.6 K/UL (ref 0.3–1)
MONOCYTES NFR BLD: 9.6 % (ref 4–15)
NEUTROPHILS # BLD AUTO: 4.3 K/UL (ref 1.8–7.7)
NEUTROPHILS NFR BLD: 65.6 % (ref 38–73)
NITRITE UR QL STRIP: NEGATIVE
NRBC BLD-RTO: 0 /100 WBC
PH UR STRIP: 5 [PH] (ref 5–8)
PLATELET # BLD AUTO: 247 K/UL (ref 150–450)
PMV BLD AUTO: 9 FL (ref 9.2–12.9)
POTASSIUM SERPL-SCNC: 4.4 MMOL/L (ref 3.5–5.1)
PROT SERPL-MCNC: 7.5 G/DL (ref 6–8.4)
PROT UR QL STRIP: NEGATIVE
PROTHROMBIN TIME: 24.7 SEC (ref 9–12.5)
RBC # BLD AUTO: 3.99 M/UL (ref 4–5.4)
RBC #/AREA URNS HPF: 2 /HPF (ref 0–4)
SODIUM SERPL-SCNC: 142 MMOL/L (ref 136–145)
SP GR UR STRIP: 1.01 (ref 1–1.03)
SQUAMOUS #/AREA URNS HPF: 0 /HPF
TSH SERPL DL<=0.005 MIU/L-ACNC: 1.12 UIU/ML (ref 0.34–5.6)
URN SPEC COLLECT METH UR: ABNORMAL
UROBILINOGEN UR STRIP-ACNC: NEGATIVE EU/DL
WBC # BLD AUTO: 6.48 K/UL (ref 3.9–12.7)
WBC #/AREA URNS HPF: 0 /HPF (ref 0–5)

## 2023-12-11 PROCEDURE — 85610 PROTHROMBIN TIME: CPT | Performed by: NURSE PRACTITIONER

## 2023-12-11 PROCEDURE — 93010 ELECTROCARDIOGRAM REPORT: CPT | Mod: ,,, | Performed by: GENERAL PRACTICE

## 2023-12-11 PROCEDURE — 85730 THROMBOPLASTIN TIME PARTIAL: CPT | Performed by: NURSE PRACTITIONER

## 2023-12-11 PROCEDURE — 99291 CRITICAL CARE FIRST HOUR: CPT

## 2023-12-11 PROCEDURE — 81001 URINALYSIS AUTO W/SCOPE: CPT | Performed by: STUDENT IN AN ORGANIZED HEALTH CARE EDUCATION/TRAINING PROGRAM

## 2023-12-11 PROCEDURE — 63600531 PHARM REV CODE 636 NO ALT 250 W HCPCS: Mod: JZ,JG | Performed by: STUDENT IN AN ORGANIZED HEALTH CARE EDUCATION/TRAINING PROGRAM

## 2023-12-11 PROCEDURE — 80053 COMPREHEN METABOLIC PANEL: CPT | Performed by: NURSE PRACTITIONER

## 2023-12-11 PROCEDURE — 63600175 PHARM REV CODE 636 W HCPCS: Performed by: STUDENT IN AN ORGANIZED HEALTH CARE EDUCATION/TRAINING PROGRAM

## 2023-12-11 PROCEDURE — 85025 COMPLETE CBC W/AUTO DIFF WBC: CPT | Performed by: NURSE PRACTITIONER

## 2023-12-11 PROCEDURE — 63600175 PHARM REV CODE 636 W HCPCS: Performed by: NURSE PRACTITIONER

## 2023-12-11 PROCEDURE — 25000003 PHARM REV CODE 250: Performed by: STUDENT IN AN ORGANIZED HEALTH CARE EDUCATION/TRAINING PROGRAM

## 2023-12-11 PROCEDURE — 25000003 PHARM REV CODE 250: Performed by: INTERNAL MEDICINE

## 2023-12-11 PROCEDURE — A4216 STERILE WATER/SALINE, 10 ML: HCPCS | Performed by: STUDENT IN AN ORGANIZED HEALTH CARE EDUCATION/TRAINING PROGRAM

## 2023-12-11 PROCEDURE — 93005 ELECTROCARDIOGRAM TRACING: CPT | Performed by: GENERAL PRACTICE

## 2023-12-11 PROCEDURE — 83036 HEMOGLOBIN GLYCOSYLATED A1C: CPT | Performed by: NURSE PRACTITIONER

## 2023-12-11 PROCEDURE — 25000003 PHARM REV CODE 250: Performed by: NURSE PRACTITIONER

## 2023-12-11 PROCEDURE — 20000000 HC ICU ROOM

## 2023-12-11 PROCEDURE — 84443 ASSAY THYROID STIM HORMONE: CPT | Performed by: NURSE PRACTITIONER

## 2023-12-11 PROCEDURE — 36415 COLL VENOUS BLD VENIPUNCTURE: CPT | Performed by: NURSE PRACTITIONER

## 2023-12-11 PROCEDURE — 94761 N-INVAS EAR/PLS OXIMETRY MLT: CPT

## 2023-12-11 RX ORDER — ONDANSETRON HYDROCHLORIDE 2 MG/ML
4 INJECTION, SOLUTION INTRAVENOUS EVERY 8 HOURS PRN
Status: DISCONTINUED | OUTPATIENT
Start: 2023-12-11 | End: 2023-12-12

## 2023-12-11 RX ORDER — LISINOPRIL 2.5 MG/1
1 TABLET ORAL DAILY
COMMUNITY
Start: 2023-10-16

## 2023-12-11 RX ORDER — HYDRALAZINE HYDROCHLORIDE 20 MG/ML
10 INJECTION INTRAMUSCULAR; INTRAVENOUS
Status: DISCONTINUED | OUTPATIENT
Start: 2023-12-11 | End: 2023-12-13 | Stop reason: HOSPADM

## 2023-12-11 RX ORDER — HYDRALAZINE HYDROCHLORIDE 20 MG/ML
10 INJECTION INTRAMUSCULAR; INTRAVENOUS
Status: COMPLETED | OUTPATIENT
Start: 2023-12-11 | End: 2023-12-11

## 2023-12-11 RX ORDER — CETIRIZINE HYDROCHLORIDE 10 MG/1
1 TABLET ORAL DAILY
COMMUNITY
Start: 2023-11-08

## 2023-12-11 RX ORDER — ALPRAZOLAM 0.5 MG/1
2 TABLET ORAL NIGHTLY PRN
Status: DISCONTINUED | OUTPATIENT
Start: 2023-12-11 | End: 2023-12-13 | Stop reason: HOSPADM

## 2023-12-11 RX ORDER — FLUTICASONE PROPIONATE 50 MCG
1 SPRAY, SUSPENSION (ML) NASAL DAILY
COMMUNITY
Start: 2023-11-08

## 2023-12-11 RX ORDER — MUPIROCIN 20 MG/G
OINTMENT TOPICAL 2 TIMES DAILY
Status: DISCONTINUED | OUTPATIENT
Start: 2023-12-11 | End: 2023-12-13 | Stop reason: HOSPADM

## 2023-12-11 RX ORDER — LISINOPRIL 2.5 MG/1
2.5 TABLET ORAL DAILY
Status: DISCONTINUED | OUTPATIENT
Start: 2023-12-11 | End: 2023-12-13 | Stop reason: HOSPADM

## 2023-12-11 RX ORDER — OXYCODONE AND ACETAMINOPHEN 5; 325 MG/1; MG/1
1 TABLET ORAL EVERY 4 HOURS PRN
Status: DISCONTINUED | OUTPATIENT
Start: 2023-12-11 | End: 2023-12-13 | Stop reason: HOSPADM

## 2023-12-11 RX ORDER — UBROGEPANT 100 MG/1
100 TABLET ORAL
COMMUNITY
Start: 2023-11-08

## 2023-12-11 RX ORDER — ESTRADIOL 0.1 MG/G
1 CREAM VAGINAL
COMMUNITY
Start: 2023-11-23 | End: 2024-11-22

## 2023-12-11 RX ORDER — ACETAMINOPHEN 325 MG/1
650 TABLET ORAL EVERY 6 HOURS PRN
Status: DISCONTINUED | OUTPATIENT
Start: 2023-12-11 | End: 2023-12-13 | Stop reason: HOSPADM

## 2023-12-11 RX ORDER — VALACYCLOVIR HYDROCHLORIDE 500 MG/1
1 TABLET, FILM COATED ORAL DAILY
COMMUNITY
Start: 2023-11-16

## 2023-12-11 RX ORDER — SODIUM CHLORIDE 0.9 % (FLUSH) 0.9 %
10 SYRINGE (ML) INJECTION EVERY 12 HOURS
Status: DISCONTINUED | OUTPATIENT
Start: 2023-12-11 | End: 2023-12-13 | Stop reason: HOSPADM

## 2023-12-11 RX ORDER — SODIUM CHLORIDE, SODIUM LACTATE, POTASSIUM CHLORIDE, CALCIUM CHLORIDE 600; 310; 30; 20 MG/100ML; MG/100ML; MG/100ML; MG/100ML
INJECTION, SOLUTION INTRAVENOUS CONTINUOUS
Status: DISCONTINUED | OUTPATIENT
Start: 2023-12-11 | End: 2023-12-13 | Stop reason: HOSPADM

## 2023-12-11 RX ORDER — PANTOPRAZOLE SODIUM 40 MG/1
40 TABLET, DELAYED RELEASE ORAL DAILY
Status: DISCONTINUED | OUTPATIENT
Start: 2023-12-12 | End: 2023-12-13 | Stop reason: HOSPADM

## 2023-12-11 RX ORDER — AZELASTINE 1 MG/ML
1 SPRAY, METERED NASAL 2 TIMES DAILY
COMMUNITY
Start: 2023-12-07 | End: 2024-01-06

## 2023-12-11 RX ORDER — HYDROMORPHONE HYDROCHLORIDE 1 MG/ML
0.5 INJECTION, SOLUTION INTRAMUSCULAR; INTRAVENOUS; SUBCUTANEOUS
Status: DISCONTINUED | OUTPATIENT
Start: 2023-12-11 | End: 2023-12-13 | Stop reason: HOSPADM

## 2023-12-11 RX ORDER — ALPRAZOLAM 0.5 MG/1
1 TABLET ORAL 3 TIMES DAILY PRN
Status: DISCONTINUED | OUTPATIENT
Start: 2023-12-11 | End: 2023-12-11

## 2023-12-11 RX ADMIN — SODIUM CHLORIDE 10 ML: 9 INJECTION INTRAMUSCULAR; INTRAVENOUS; SUBCUTANEOUS at 07:12

## 2023-12-11 RX ADMIN — ALPRAZOLAM 2 MG: 0.5 TABLET ORAL at 10:12

## 2023-12-11 RX ADMIN — PROTHROMBIN, COAGULATION FACTOR VII HUMAN, COAGULATION FACTOR IX HUMAN, COAGULATION FACTOR X HUMAN, PROTEIN C, PROTEIN S HUMAN, AND WATER 1957.5 UNITS: KIT at 09:12

## 2023-12-11 RX ADMIN — MUPIROCIN 1 G: 20 OINTMENT TOPICAL at 09:12

## 2023-12-11 RX ADMIN — HYDRALAZINE HYDROCHLORIDE 10 MG: 20 INJECTION, SOLUTION INTRAMUSCULAR; INTRAVENOUS at 10:12

## 2023-12-11 RX ADMIN — PHYTONADIONE 10 MG: 10 INJECTION, EMULSION INTRAMUSCULAR; INTRAVENOUS; SUBCUTANEOUS at 07:12

## 2023-12-11 RX ADMIN — SODIUM CHLORIDE, SODIUM LACTATE, POTASSIUM CHLORIDE, AND CALCIUM CHLORIDE: .6; .31; .03; .02 INJECTION, SOLUTION INTRAVENOUS at 09:12

## 2023-12-11 RX ADMIN — LISINOPRIL 2.5 MG: 2.5 TABLET ORAL at 07:12

## 2023-12-11 RX ADMIN — HYDRALAZINE HYDROCHLORIDE 10 MG: 20 INJECTION INTRAMUSCULAR; INTRAVENOUS at 07:12

## 2023-12-11 NOTE — TELEPHONE ENCOUNTER
"Pt called to say that she had gone to an ENT because she had been having ringing in her ear, a 'swishing" sound, elevated heart rate, elevated BP, bad headaches, etc. Dr. Leon [phone] had called her back today with results of MRI and concerned that she had a bleed recently and that she should contact her hematologist since she is on Coumadin and see about going to the ER that has a neurosurgeon in case Dr. Trevizo feels it necessary.   They will be faxing over MRI to the office and I advised patient that I would contact her after speaking with Dr. Trevizo. Pt verbalized understanding.  "

## 2023-12-11 NOTE — TELEPHONE ENCOUNTER
Late entry. Did advise pt to hold her Coumadin today until further notice. Pt verbalized understanding.

## 2023-12-11 NOTE — TELEPHONE ENCOUNTER
Spoke with Dr. Trevizo and showed him MRI from today 12/11/23 that Dr. Leon ordered. Said that patient does need to go to an ER that has a neurosurgeon on call. Called [facility] and spoke with Graeme Parker RN and gave him report that patient would be coming to be consulted/possibly admitted.  Called patient and explained to her the situation and she said that she would get to [facility]. Advised would follow up tomorrow. Pt verbalized understanding.

## 2023-12-12 ENCOUNTER — CLINICAL SUPPORT (OUTPATIENT)
Dept: CARDIOLOGY | Facility: HOSPITAL | Age: 58
DRG: 813 | End: 2023-12-12
Attending: STUDENT IN AN ORGANIZED HEALTH CARE EDUCATION/TRAINING PROGRAM
Payer: MEDICARE

## 2023-12-12 VITALS — BODY MASS INDEX: 19.61 KG/M2 | WEIGHT: 122 LBS | HEIGHT: 66 IN

## 2023-12-12 LAB
ALBUMIN SERPL BCP-MCNC: 4 G/DL (ref 3.5–5.2)
ALP SERPL-CCNC: 67 U/L (ref 55–135)
ALT SERPL W/O P-5'-P-CCNC: 13 U/L (ref 10–44)
ANION GAP SERPL CALC-SCNC: 6 MMOL/L (ref 8–16)
AST SERPL-CCNC: 16 U/L (ref 10–40)
AV INDEX (PROSTH): 0.64
AV MEAN GRADIENT: 7 MMHG
AV PEAK GRADIENT: 13 MMHG
AV VALVE AREA BY VELOCITY RATIO: 1.87 CM²
AV VALVE AREA: 1.82 CM²
AV VELOCITY RATIO: 0.66
BILIRUB SERPL-MCNC: 0.6 MG/DL (ref 0.1–1)
BSA FOR ECHO PROCEDURE: 1.61 M2
BUN SERPL-MCNC: 10 MG/DL (ref 6–20)
CALCIUM SERPL-MCNC: 9.1 MG/DL (ref 8.7–10.5)
CHLORIDE SERPL-SCNC: 107 MMOL/L (ref 95–110)
CHOLEST SERPL-MCNC: 234 MG/DL (ref 120–199)
CHOLEST/HDLC SERPL: 2.4 {RATIO} (ref 2–5)
CO2 SERPL-SCNC: 28 MMOL/L (ref 23–29)
CREAT SERPL-MCNC: 0.6 MG/DL (ref 0.5–1.4)
CV ECHO LV RWT: 0.35 CM
DOP CALC AO PEAK VEL: 1.8 M/S
DOP CALC AO VTI: 33.3 CM
DOP CALC LVOT AREA: 2.8 CM2
DOP CALC LVOT DIAMETER: 1.9 CM
DOP CALC LVOT PEAK VEL: 1.19 M/S
DOP CALC LVOT STROKE VOLUME: 60.64 CM3
DOP CALC MV VTI: 25.3 CM
DOP CALCLVOT PEAK VEL VTI: 21.4 CM
E WAVE DECELERATION TIME: 168 MSEC
E/A RATIO: 0.83
E/E' RATIO: 7.42 M/S
ECHO LV POSTERIOR WALL: 0.69 CM (ref 0.6–1.1)
ERYTHROCYTE [DISTWIDTH] IN BLOOD BY AUTOMATED COUNT: 12.4 % (ref 11.5–14.5)
EST. GFR  (NO RACE VARIABLE): >60 ML/MIN/1.73 M^2
ESTIMATED AVG GLUCOSE: 108 MG/DL (ref 68–131)
FRACTIONAL SHORTENING: 37 % (ref 28–44)
GLUCOSE SERPL-MCNC: 106 MG/DL (ref 70–110)
HBA1C MFR BLD: 5.4 % (ref 4.5–6.2)
HCT VFR BLD AUTO: 39.5 % (ref 37–48.5)
HDLC SERPL-MCNC: 97 MG/DL (ref 40–75)
HDLC SERPL: 41.5 % (ref 20–50)
HGB BLD-MCNC: 13.1 G/DL (ref 12–16)
INR PPP: 1 (ref 0.8–1.2)
INTERVENTRICULAR SEPTUM: 0.54 CM (ref 0.6–1.1)
IVC DIAMETER: 2.14 CM
LDLC SERPL CALC-MCNC: 127 MG/DL (ref 63–159)
LEFT INTERNAL DIMENSION IN SYSTOLE: 2.48 CM (ref 2.1–4)
LEFT VENTRICLE DIASTOLIC VOLUME INDEX: 40.93 ML/M2
LEFT VENTRICLE DIASTOLIC VOLUME: 66.3 ML
LEFT VENTRICLE MASS INDEX: 39 G/M2
LEFT VENTRICLE SYSTOLIC VOLUME INDEX: 13.5 ML/M2
LEFT VENTRICLE SYSTOLIC VOLUME: 21.9 ML
LEFT VENTRICULAR INTERNAL DIMENSION IN DIASTOLE: 3.91 CM (ref 3.5–6)
LEFT VENTRICULAR MASS: 63.85 G
LV LATERAL E/E' RATIO: 6.39 M/S
LV SEPTAL E/E' RATIO: 8.85 M/S
LVOT MG: 3 MMHG
LVOT MV: 0.83 CM/S
MCH RBC QN AUTO: 31 PG (ref 27–31)
MCHC RBC AUTO-ENTMCNC: 33.2 G/DL (ref 32–36)
MCV RBC AUTO: 93 FL (ref 82–98)
MV MEAN GRADIENT: 5 MMHG
MV PEAK A VEL: 1.39 M/S
MV PEAK E VEL: 1.15 M/S
MV PEAK GRADIENT: 7 MMHG
MV STENOSIS PRESSURE HALF TIME: 54 MS
MV VALVE AREA BY CONTINUITY EQUATION: 2.4 CM2
MV VALVE AREA P 1/2 METHOD: 4.07 CM2
NONHDLC SERPL-MCNC: 137 MG/DL
OHS LV EJECTION FRACTION SIMPSONS BIPLANE MOD: 66 %
PISA TR MAX VEL: 1.69 M/S
PLATELET # BLD AUTO: 261 K/UL (ref 150–450)
PMV BLD AUTO: 9.5 FL (ref 9.2–12.9)
POTASSIUM SERPL-SCNC: 3.7 MMOL/L (ref 3.5–5.1)
PROT SERPL-MCNC: 6.9 G/DL (ref 6–8.4)
PROTHROMBIN TIME: 11.1 SEC (ref 9–12.5)
PV MV: 1.01 M/S
PV PEAK GRADIENT: 8 MMHG
PV PEAK VELOCITY: 1.44 M/S
RA PRESSURE ESTIMATED: 3 MMHG
RA PRESSURE: 15 MMHG
RBC # BLD AUTO: 4.23 M/UL (ref 4–5.4)
RV TB RVSP: 5 MMHG
RV TISSUE DOPPLER FREE WALL SYSTOLIC VELOCITY 1 (APICAL 4 CHAMBER VIEW): 18.1 CM/S
SODIUM SERPL-SCNC: 141 MMOL/L (ref 136–145)
TDI LATERAL: 0.18 M/S
TDI SEPTAL: 0.13 M/S
TDI: 0.16 M/S
TR MAX PG: 11 MMHG
TRICUSPID ANNULAR PLANE SYSTOLIC EXCURSION: 2.9 CM
TRIGL SERPL-MCNC: 50 MG/DL (ref 30–150)
TV REST PULMONARY ARTERY PRESSURE: 14 MMHG
WBC # BLD AUTO: 13.56 K/UL (ref 3.9–12.7)
Z-SCORE OF LEFT VENTRICULAR DIMENSION IN END DIASTOLE: -1.59
Z-SCORE OF LEFT VENTRICULAR DIMENSION IN END SYSTOLE: -1.08

## 2023-12-12 PROCEDURE — 25000003 PHARM REV CODE 250: Performed by: INTERNAL MEDICINE

## 2023-12-12 PROCEDURE — 93005 ELECTROCARDIOGRAM TRACING: CPT | Performed by: GENERAL PRACTICE

## 2023-12-12 PROCEDURE — 93010 ELECTROCARDIOGRAM REPORT: CPT | Mod: ,,, | Performed by: GENERAL PRACTICE

## 2023-12-12 PROCEDURE — 20000000 HC ICU ROOM

## 2023-12-12 PROCEDURE — 85027 COMPLETE CBC AUTOMATED: CPT | Performed by: STUDENT IN AN ORGANIZED HEALTH CARE EDUCATION/TRAINING PROGRAM

## 2023-12-12 PROCEDURE — 80053 COMPREHEN METABOLIC PANEL: CPT | Performed by: STUDENT IN AN ORGANIZED HEALTH CARE EDUCATION/TRAINING PROGRAM

## 2023-12-12 PROCEDURE — 94761 N-INVAS EAR/PLS OXIMETRY MLT: CPT

## 2023-12-12 PROCEDURE — 63600175 PHARM REV CODE 636 W HCPCS: Performed by: INTERNAL MEDICINE

## 2023-12-12 PROCEDURE — 93306 ECHO (CUPID ONLY): ICD-10-PCS | Mod: 26,,, | Performed by: INTERNAL MEDICINE

## 2023-12-12 PROCEDURE — 25500020 PHARM REV CODE 255: Performed by: INTERNAL MEDICINE

## 2023-12-12 PROCEDURE — 80061 LIPID PANEL: CPT | Performed by: STUDENT IN AN ORGANIZED HEALTH CARE EDUCATION/TRAINING PROGRAM

## 2023-12-12 PROCEDURE — A4216 STERILE WATER/SALINE, 10 ML: HCPCS | Performed by: STUDENT IN AN ORGANIZED HEALTH CARE EDUCATION/TRAINING PROGRAM

## 2023-12-12 PROCEDURE — 85610 PROTHROMBIN TIME: CPT | Performed by: STUDENT IN AN ORGANIZED HEALTH CARE EDUCATION/TRAINING PROGRAM

## 2023-12-12 PROCEDURE — 25000003 PHARM REV CODE 250: Performed by: STUDENT IN AN ORGANIZED HEALTH CARE EDUCATION/TRAINING PROGRAM

## 2023-12-12 PROCEDURE — 93306 TTE W/DOPPLER COMPLETE: CPT

## 2023-12-12 PROCEDURE — 36415 COLL VENOUS BLD VENIPUNCTURE: CPT | Performed by: STUDENT IN AN ORGANIZED HEALTH CARE EDUCATION/TRAINING PROGRAM

## 2023-12-12 PROCEDURE — 93306 TTE W/DOPPLER COMPLETE: CPT | Mod: 26,,, | Performed by: INTERNAL MEDICINE

## 2023-12-12 PROCEDURE — 99222 1ST HOSP IP/OBS MODERATE 55: CPT | Mod: ,,, | Performed by: NEUROLOGICAL SURGERY

## 2023-12-12 RX ORDER — METOCLOPRAMIDE HYDROCHLORIDE 5 MG/ML
10 INJECTION INTRAMUSCULAR; INTRAVENOUS EVERY 6 HOURS
Status: DISCONTINUED | OUTPATIENT
Start: 2023-12-12 | End: 2023-12-12

## 2023-12-12 RX ORDER — METOCLOPRAMIDE HYDROCHLORIDE 5 MG/ML
10 INJECTION INTRAMUSCULAR; INTRAVENOUS EVERY 6 HOURS PRN
Status: DISCONTINUED | OUTPATIENT
Start: 2023-12-12 | End: 2023-12-13 | Stop reason: HOSPADM

## 2023-12-12 RX ORDER — ONDANSETRON HYDROCHLORIDE 2 MG/ML
4 INJECTION, SOLUTION INTRAVENOUS EVERY 8 HOURS PRN
Status: DISCONTINUED | OUTPATIENT
Start: 2023-12-12 | End: 2023-12-13 | Stop reason: HOSPADM

## 2023-12-12 RX ORDER — PROMETHAZINE HYDROCHLORIDE 25 MG/1
25 TABLET ORAL EVERY 6 HOURS PRN
Status: DISCONTINUED | OUTPATIENT
Start: 2023-12-12 | End: 2023-12-13 | Stop reason: HOSPADM

## 2023-12-12 RX ADMIN — IOHEXOL 100 ML: 350 INJECTION, SOLUTION INTRAVENOUS at 04:12

## 2023-12-12 RX ADMIN — MUPIROCIN 1 G: 20 OINTMENT TOPICAL at 08:12

## 2023-12-12 RX ADMIN — SODIUM CHLORIDE 10 ML: 9 INJECTION INTRAMUSCULAR; INTRAVENOUS; SUBCUTANEOUS at 09:12

## 2023-12-12 RX ADMIN — PROMETHAZINE HYDROCHLORIDE 25 MG: 25 TABLET ORAL at 07:12

## 2023-12-12 RX ADMIN — PANTOPRAZOLE SODIUM 40 MG: 40 TABLET, DELAYED RELEASE ORAL at 06:12

## 2023-12-12 RX ADMIN — LISINOPRIL 2.5 MG: 2.5 TABLET ORAL at 02:12

## 2023-12-12 NOTE — PLAN OF CARE
12/12/23 0720   Patient Assessment/Suction   Level of Consciousness (AVPU) alert   Respiratory Effort Unlabored   PRE-TX-O2   Device (Oxygen Therapy) room air   SpO2 99 %   Pulse Oximetry Type Continuous   $ Pulse Oximetry - Multiple Charge Pulse Oximetry - Multiple   Pulse 101   Resp 18

## 2023-12-12 NOTE — SUBJECTIVE & OBJECTIVE
Past Medical History:   Diagnosis Date    Clotting disorder     Gastric paresis     Hypercoagulable state        Past Surgical History:   Procedure Laterality Date    CHOLECYSTECTOMY      COLON SURGERY      HYSTERECTOMY      ILEOSTOMY      STOMACH SURGERY      superior Mesenteric Artery Syndrome      TOTAL ABDOMINAL HYSTERECTOMY         Review of patient's allergies indicates:   Allergen Reactions    Ketorolac     Morphine        No current facility-administered medications on file prior to encounter.     Current Outpatient Medications on File Prior to Encounter   Medication Sig    alprazolam (XANAX) 1 MG tablet Take 2 mg by mouth nightly as needed for Anxiety.    HYDROcodone-acetaminophen (NORCO) 7.5-325 mg per tablet Take 1 tablet by mouth every 12 (twelve) hours as needed for Pain.     metoclopramide HCl (REGLAN) 10 MG tablet Take 10 mg by mouth before meals and at bedtime as needed (nausea).     pantoprazole (PROTONIX) 40 MG tablet Take 40 mg by mouth once daily.    warfarin (COUMADIN) 4 MG tablet Take 1 tablet (4 mg total) by mouth Daily.     Family History    None       Tobacco Use    Smoking status: Never    Smokeless tobacco: Not on file   Substance and Sexual Activity    Alcohol use: No    Drug use: No    Sexual activity: Not on file     Review of Systems   Constitutional: Negative.    HENT:  Positive for hearing loss and tinnitus.    Eyes: Negative.    Respiratory: Negative.     Cardiovascular: Negative.    Gastrointestinal: Negative.    Endocrine: Negative.    Genitourinary: Negative.    Musculoskeletal: Negative.    Skin: Negative.    Neurological:  Positive for dizziness and headaches. Negative for tremors, seizures, syncope, facial asymmetry, speech difficulty, weakness, light-headedness and numbness.   Hematological: Negative.    Psychiatric/Behavioral: Negative.       Objective:     Vital Signs (Most Recent):  Temp: 98.2 °F (36.8 °C) (12/11/23 1814)  Pulse: 99 (12/11/23 1851)  Resp: 16 (12/11/23  1812)  BP: (!) 192/99 (12/11/23 1851)  SpO2: 100 % (12/11/23 1851) Vital Signs (24h Range):  Temp:  [98.2 °F (36.8 °C)] 98.2 °F (36.8 °C)  Pulse:  [] 99  Resp:  [16] 16  SpO2:  [99 %-100 %] 100 %  BP: (173-192)/() 192/99     Weight: 52.2 kg (115 lb)  Body mass index is 18.56 kg/m².     Physical Exam  Vitals and nursing note reviewed.   Constitutional:       General: She is not in acute distress.     Appearance: Normal appearance.   HENT:      Head: Normocephalic and atraumatic.      Nose: Nose normal.   Eyes:      Extraocular Movements: Extraocular movements intact.      Conjunctiva/sclera: Conjunctivae normal.      Pupils: Pupils are equal, round, and reactive to light.   Cardiovascular:      Rate and Rhythm: Regular rhythm. Tachycardia present.   Pulmonary:      Effort: Pulmonary effort is normal. No respiratory distress.      Breath sounds: Normal breath sounds. No stridor. No wheezing or rales.   Abdominal:      General: Bowel sounds are normal. There is no distension.      Palpations: Abdomen is soft.      Tenderness: There is no abdominal tenderness.   Musculoskeletal:         General: No swelling or tenderness. Normal range of motion.      Cervical back: Normal range of motion and neck supple.      Right lower leg: No edema.      Left lower leg: No edema.   Skin:     General: Skin is warm and dry.   Neurological:      General: No focal deficit present.      Mental Status: She is alert and oriented to person, place, and time.   Psychiatric:         Mood and Affect: Mood normal.         Behavior: Behavior normal.         Thought Content: Thought content normal.         Judgment: Judgment normal.              CRANIAL NERVES     CN III, IV, VI   Pupils are equal, round, and reactive to light.       Significant Labs: All pertinent labs within the past 24 hours have been reviewed.  Recent Lab Results       None            Significant Imaging: I have reviewed all pertinent imaging results/findings within  the past 24 hours.

## 2023-12-12 NOTE — HPI
Patient is a 58-year-old male with history of PE on warfarin, hypertension, and GERD who presents after an abnormal MRI of head today.  Since October, patient has had a headache, vertigo, tinnitus, and some memory loss.  Patient says she initially thought she had a cold.  The headaches have been worsening.  Patient had a CT of the head on November 1st which was normal.  Patient had an outpatient MRI brain today which showed small bilateral subdural hematomas, larger on the right, likely subacute. Slight right to left midline shift.  Patient denies any fevers, chills, chest pain, or shortness for breath.  Patient checks her INR at home weekly and it is usually in the low 2s.  Patient denies having any elevated INRs.  INR last night was 2.3.  Neurosurgery and Heme-Onc consulted.  Labs are pending.

## 2023-12-12 NOTE — HOSPITAL COURSE
Patient is the 58 years old admitted to the hospital for subdural hematoma on MRI.  Neurosurgery consult and repeat CT scan Acute subdural hemorrhage within chronic appearing subdural collections bilaterally each measure up to 0.8 cm.  As per neurosurgeon, continue monitor the patient in ICU.  Patient repeat CT scan head today IMPRESSION: Stable bilateral subacute to chronic subdural fluid collections .  From neurosurgery standpoint the patient can be discharged home.  We will hold Coumadin for now.  Patient needs to follow up with Neurosurgery and Hematology outpatient.  Offer IVC filter but patient refused

## 2023-12-12 NOTE — ASSESSMENT & PLAN NOTE
Ms. Marianela Potter 58-year-old female with past medical history ulcerative colitis, TIA, hypertension, mitral valve prolapse, GERD, bilateral hearing loss, tinnitus, prothrombin gene mutation, homozygous MTHFR mutation, PE x2 treated with Coumadin.  Patient presented to the emergency department on 12/11/2023 after being prompted by ENT due to an abnormal MRI which revealed bilateral subdural hematoma.     Repeat head CT demonstrated acute subdural hemorrhage within chronic appearing subdural collections bilaterally measuring of 0.8 cm without midline shift.    --Patient admitted to [facility]    -[facility] neurochecks in ICU  --All labs and diagnostics reviewed.  INR 1.0  --Warfarin has been reversed  --SBP <140   --Na >135  --Continue to monitor clinically, notify NSGY immediately with any changes in neuro status    Dispo: Additional recommendations to follow.

## 2023-12-12 NOTE — ASSESSMENT & PLAN NOTE
MRI brain today which showed small bilateral subdural hematomas, larger on the right, likely subacute. Slight right to left midline shift.   Neurosurgery consulted who recommends repeating CT head in a.m..    Monitor in ICU.  Neuro checks q1 hours.  Resume home lisinopril and hydralazine p.r.n. for SBP >140  Labs pending including INR.  INR daily.  Holding warfarin.  Heme-Onc consulted.     Mental status is close to baseline, is awake, active, alert, not oriented, non focal  Patient with fever, mental status change likely due to underlying infection, dehydration  CT head without acute stroke  Will monitor with neuro check, tele, IVF

## 2023-12-12 NOTE — PHARMACY MED REC
"              .        Admission Medication History     The home medication history was taken by Teresa Hernandez.    You may go to "Admission" then "Reconcile Home Medications" tabs to review and/or act upon these items.     The home medication list has been updated by the Pharmacy department.   Please read ALL comments highlighted in yellow.   Please address this information as you see fit.    Feel free to contact us if you have any questions or require assistance.        Medications listed below were obtained from: Patient/family and Analytic software- [employer]  No current facility-administered medications on file prior to encounter.     Current Outpatient Medications on File Prior to Encounter   Medication Sig Dispense Refill    alprazolam (XANAX) 1 MG tablet Take 2 mg by mouth nightly as needed for Anxiety.      azelastine (ASTELIN) 137 mcg (0.1 %) nasal spray 1 spray by Nasal route 2 (two) times daily.      cetirizine (ZYRTEC) 10 MG tablet Take 1 tablet by mouth once daily.      estradioL (ESTRACE) 0.01 % (0.1 mg/gram) vaginal cream Place 1 g vaginally twice a week.      fluticasone propionate (FLONASE) 50 mcg/actuation nasal spray 1 spray by Each Nostril route once daily.      HYDROcodone-acetaminophen (NORCO) 7.5-325 mg per tablet Take 1 tablet by mouth every 12 (twelve) hours as needed for Pain.       lisinopriL (PRINIVIL,ZESTRIL) 2.5 MG tablet Take 1 tablet by mouth once daily.      metoclopramide HCl (REGLAN) 10 MG tablet Take 10 mg by mouth before meals and at bedtime as needed (nausea).       pantoprazole (PROTONIX) 40 MG tablet Take 40 mg by mouth once daily.      ubrogepant (UBRELVY) 100 mg tablet Take 100 mg by mouth as needed for Migraine.      valACYclovir (VALTREX) 500 MG tablet Take 1 tablet by mouth once daily.      warfarin (COUMADIN) 4 MG tablet Take 1 tablet (4 mg total) by mouth Daily. 30 tablet 5           Teresa Hernandez  EXT 1924      "

## 2023-12-12 NOTE — HPI
Ms. Marianela Potter 58-year-old female with past medical history ulcerative colitis, TIA, hypertension, mitral valve prolapse, GERD, bilateral hearing loss, tinnitus, prothrombin gene mutation, homozygous MTHFR mutation, PE x2 treated with Coumadin.  Patient presented to the emergency department on 12/11/2023 after being prompted by ENT due to an abnormal MRI which revealed bilateral subdural hematoma.  Patient reports in October of this year she began having vertigo, increased pressure in head, vibratory sensation in maxillary sinuses when speaking, hypersensitivity to noise and short-term memory loss.  Today, patient reports frontal head pressure which has slightly improved since admission.  Additionally patient reports she is on lisinopril for hypertension but does not take medication every day because it sometimes cause hypotension.  She states she does monitor her blood pressure at home and when it begins rising she takes her lisinopril.    INR 1.0    Of note, patient is followed by Dr. Charlton, hematologist.    Neurosurgery consulted.

## 2023-12-12 NOTE — SUBJECTIVE & OBJECTIVE
Medications Prior to Admission   Medication Sig Dispense Refill Last Dose    alprazolam (XANAX) 1 MG tablet Take 2 mg by mouth nightly as needed for Anxiety.   12/10/2023    azelastine (ASTELIN) 137 mcg (0.1 %) nasal spray 1 spray by Nasal route 2 (two) times daily.   Past Week    cetirizine (ZYRTEC) 10 MG tablet Take 1 tablet by mouth once daily.   12/10/2023    estradioL (ESTRACE) 0.01 % (0.1 mg/gram) vaginal cream Place 1 g vaginally twice a week.   Past Month    fluticasone propionate (FLONASE) 50 mcg/actuation nasal spray 1 spray by Each Nostril route once daily.   12/10/2023    HYDROcodone-acetaminophen (NORCO) 7.5-325 mg per tablet Take 1 tablet by mouth every 12 (twelve) hours as needed for Pain.    Past Week    lisinopriL (PRINIVIL,ZESTRIL) 2.5 MG tablet Take 1 tablet by mouth once daily.   12/11/2023    metoclopramide HCl (REGLAN) 10 MG tablet Take 10 mg by mouth before meals and at bedtime as needed (nausea).    Past Week    pantoprazole (PROTONIX) 40 MG tablet Take 40 mg by mouth once daily.   Past Week    ubrogepant (UBRELVY) 100 mg tablet Take 100 mg by mouth as needed for Migraine.   Past Month    valACYclovir (VALTREX) 500 MG tablet Take 1 tablet by mouth once daily.   Past Week    warfarin (COUMADIN) 4 MG tablet Take 1 tablet (4 mg total) by mouth Daily. 30 tablet 5 12/10/2023       Review of patient's allergies indicates:   Allergen Reactions    Ketorolac     Morphine        Past Medical History:   Diagnosis Date    Clotting disorder     Gastric paresis     Hypercoagulable state      Past Surgical History:   Procedure Laterality Date    CHOLECYSTECTOMY      COLON SURGERY      HYSTERECTOMY      ILEOSTOMY      STOMACH SURGERY      superior Mesenteric Artery Syndrome      TOTAL ABDOMINAL HYSTERECTOMY       Family History    None       Tobacco Use    Smoking status: Never    Smokeless tobacco: Not on file   Substance and Sexual Activity    Alcohol use: No    Drug use: No    Sexual activity: Not on file        Objective:     Weight: 55.5 kg (122 lb 5.7 oz)  Body mass index is 19.75 kg/m².  Vital Signs (Most Recent):  Temp: 98.8 °F (37.1 °C) (12/12/23 1101)  Pulse: 93 (12/12/23 1101)  Resp: 15 (12/12/23 1101)  BP: (!) 146/77 (12/12/23 1101)  SpO2: 99 % (12/12/23 1101) Vital Signs (24h Range):  Temp:  [98 °F (36.7 °C)-98.8 °F (37.1 °C)] 98.8 °F (37.1 °C)  Pulse:  [] 93  Resp:  [15-52] 15  SpO2:  [96 %-100 %] 99 %  BP: ()/() 146/77     Date 12/12/23 0700 - 12/13/23 0659   Shift 8942-4115 2320-8636 5058-1828 24 Hour Total   INTAKE   I.V.(mL/kg) 966.7(17.4)   966.7(17.4)   IV Piggyback 0   0   Shift Total(mL/kg) 966.7(17.4)   966.7(17.4)   OUTPUT   Urine(mL/kg/hr) 300   300   Shift Total(mL/kg) 300(5.4)   300(5.4)   Weight (kg) 55.5 55.5 55.5 55.5                            Ileostomy 04/29/21 0357 RLQ (Active)         Neurosurgery Physical Exam  General: well developed, well nourished, no distress.   Head: normocephalic, atraumatic  Neck: No tracheal deviation.   Neurologic: Alert and oriented. Thought content appropriate.  GCS: E4 V5 M6; Total: 15  Mental Status: Awake, Alert, Oriented to self, month, year and events surrounding admission.  Language: No aphasia  Speech: No dysarthria  Cranial nerves: face symmetric, tongue midline, CN II-XII grossly intact.   Eyes: pupils equal, round, reactive to light, EOMI.   Pulmonary: normal respirations, no signs of respiratory distress  Skin: Skin is warm, dry and intact.    Motor Strength: Moves all extremities spontaneously with good tone.  No abnormal movements seen.     Finger-to-nose: intact bilaterally   Pronator drift: mild pronator drift on left      Significant Labs:  Recent Labs   Lab 12/11/23  1902 12/12/23  0231   GLU 94 106    141   K 4.4 3.7    107   CO2 27 28   BUN 10 10   CREATININE 0.7 0.6   CALCIUM 8.7 9.1     Recent Labs   Lab 12/11/23 1955 12/12/23 0232   WBC 6.48 13.56*   HGB 12.5 13.1   HCT 37.5 39.5    261     Recent  Labs   Lab 12/11/23  1902 12/12/23  0232   INR 2.3* 1.0   APTT 28.2  --      Microbiology Results (last 7 days)       ** No results found for the last 168 hours. **

## 2023-12-12 NOTE — SUBJECTIVE & OBJECTIVE
Objective:     Vital Signs (Most Recent):  Temp: 98.8 °F (37.1 °C) (12/12/23 1101)  Pulse: 93 (12/12/23 1101)  Resp: 15 (12/12/23 1101)  BP: (!) 146/77 (12/12/23 1101)  SpO2: 99 % (12/12/23 1101) Vital Signs (24h Range):  Temp:  [98 °F (36.7 °C)-98.8 °F (37.1 °C)] 98.8 °F (37.1 °C)  Pulse:  [] 93  Resp:  [15-52] 15  SpO2:  [96 %-100 %] 99 %  BP: ()/() 146/77     Weight: 55.5 kg (122 lb 5.7 oz)  Body mass index is 19.75 kg/m².    Intake/Output Summary (Last 24 hours) at 12/12/2023 1209  Last data filed at 12/12/2023 0724  Gross per 24 hour   Intake 1066.67 ml   Output 600 ml   Net 466.67 ml         Physical Exam  Constitutional:       General: She is not in acute distress.  HENT:      Head: Normocephalic and atraumatic.      Nose: No congestion.   Eyes:      General: No scleral icterus.        Right eye: No discharge.         Left eye: No discharge.      Extraocular Movements: Extraocular movements intact.   Cardiovascular:      Rate and Rhythm: Normal rate and regular rhythm.   Pulmonary:      Effort: Pulmonary effort is normal.      Breath sounds: Normal breath sounds.   Abdominal:      General: Abdomen is flat. Bowel sounds are normal.   Musculoskeletal:         General: No swelling or tenderness.      Cervical back: Normal range of motion and neck supple. No rigidity.   Skin:     General: Skin is warm.   Neurological:      General: No focal deficit present.      Mental Status: She is alert and oriented to person, place, and time.   Psychiatric:         Mood and Affect: Mood normal.             Significant Labs: All pertinent labs within the past 24 hours have been reviewed.  CBC:   Recent Labs   Lab 12/11/23 1955 12/12/23  0232   WBC 6.48 13.56*   HGB 12.5 13.1   HCT 37.5 39.5    261     CMP:   Recent Labs   Lab 12/11/23  1902 12/12/23  0231    141   K 4.4 3.7    107   CO2 27 28   GLU 94 106   BUN 10 10   CREATININE 0.7 0.6   CALCIUM 8.7 9.1   PROT 7.5 6.9   ALBUMIN 4.2  4.0   BILITOT 0.4 0.6   ALKPHOS 74 67   AST 23 16   ALT 14 13   ANIONGAP 13 6*       Significant Imaging: I have reviewed all pertinent imaging results/findings within the past 24 hours.

## 2023-12-12 NOTE — PROGRESS NOTES
[facility] Medicine  Progress Note    Patient Name: Marianela Potter  MRN: [id number]  Patient Class: IP- Inpatient   Admission Date: 12/11/2023  Length of Stay: 1 days  Attending Physician: Kolton Kline MD  Primary Care Provider: Sage Gallardo MD        Subjective:     Principal Problem:Non-traumatic subdural hematoma (SDH)        HPI:  Patient is a 58-year-old male with history of PE on warfarin, hypertension, and GERD who presents after an abnormal MRI of head today.  Since October, patient has had a headache, vertigo, tinnitus, and some memory loss.  Patient says she initially thought she had a cold.  The headaches have been worsening.  Patient had a CT of the head on November 1st which was normal.  Patient had an outpatient MRI brain today which showed small bilateral subdural hematomas, larger on the right, likely subacute. Slight right to left midline shift.  Patient denies any fevers, chills, chest pain, or shortness for breath.  Patient checks her INR at home weekly and it is usually in the low 2s.  Patient denies having any elevated INRs.  INR last night was 2.3.  Neurosurgery and Heme-Onc consulted.  Labs are pending.    Overview/Hospital Course:  Patient is the 58 years old admitted to the hospital for subdural hematoma on MRI.  Neurosurgery consult and repeat CT scan Acute subdural hemorrhage within chronic appearing subdural collections bilaterally each measure up to 0.8 cm.  As per neurosurgeon, continue monitor the patient in ICU.  Pending Hematology Oncology consult regarding to anticoagulation for PE    Physical Exam  Constitutional:       General: She is not in acute distress.  HENT:      Head: Normocephalic and atraumatic.      Nose: No congestion.   Eyes:      General: No scleral icterus.        Right eye: No discharge.         Left eye: No discharge.      Extraocular Movements: Extraocular movements intact.   Cardiovascular:      Rate and Rhythm: Normal rate  "and regular rhythm.   Pulmonary:      Effort: Pulmonary effort is normal.      Breath sounds: Normal breath sounds.   Abdominal:      General: Abdomen is flat. Bowel sounds are normal.   Musculoskeletal:         General: No swelling or tenderness.      Cervical back: Normal range of motion and neck supple. No rigidity.   Skin:     General: Skin is warm.   Neurological:      General: No focal deficit present.      Mental Status: She is alert and oriented to person, place, and time.   Psychiatric:         Mood and Affect: Mood normal.        CBC LAST 3 DAYS  Recent Labs   Lab 12/11/23 1955 12/12/23  0232   WBC 6.48 13.56*   RBC 3.99* 4.23   HGB 12.5 13.1   HCT 37.5 39.5   MCV 94 93   MCH 31.3* 31.0   MCHC 33.3 33.2   RDW 12.1 12.4    261   MPV 9.0* 9.5   GRAN 65.6  4.3  --    LYMPH 22.8  1.5  --    MONO 9.6  0.6  --    BASO 0.05  --    NRBC 0  --          CHEMISTRY LAST 3 DAYS  Recent Labs   Lab 12/11/23  1902 12/12/23  0231    141   K 4.4 3.7    107   CO2 27 28   ANIONGAP 13 6*   BUN 10 10   CREATININE 0.7 0.6   GLU 94 106   CALCIUM 8.7 9.1   ALBUMIN 4.2 4.0   PROT 7.5 6.9   ALKPHOS 74 67   ALT 14 13   AST 23 16   BILITOT 0.4 0.6           ENDOCRINE  Lab Results   Component Value Date    TSH 1.116 12/11/2023         CARDIAC PROFILE LAST 3 DAYS  No results for input(s): "BNP", "CPK", "CPKMB", "LDH", "TROPONINI", "TROPONINIHS" in the last 168 hours.      LAST ARTERIAL BLOOD GAS  ABG  No results for input(s): "PH", "PO2", "PCO2", "HCO3", "BE" in the last 168 hours.    LAST 7 DAYS MICROBIOLOGY   Microbiology Results (last 7 days)       ** No results found for the last 168 hours. **                ,   Assessment/Plan:      * Non-traumatic subdural hematoma (SDH)  MRI brain today which showed small bilateral subdural hematomas, larger on the right, likely subacute. Slight right to left midline shift.   Neurosurgery consulted who recommends repeating CT head in a.m..    Monitor in ICU.  Neuro checks " q1 hours.  Resume home lisinopril and hydralazine p.r.n. for SBP >140  Labs pending including INR.  INR daily.  Holding warfarin.  Heme-Onc consulted.      GERD (gastroesophageal reflux disease)  PPI      Hypertension  Continue lisinopril 2.5 mg daily.  Patient missed dose this morning.  Hydralazine p.r.n. for SBP >140  Obtain EKG and echo.    History of pulmonary embolus (PE)  Holding warfarin in setting of SDH.  INR daily.  Heme-Onc consulted.        VTE Risk Mitigation (From admission, onward)           Ordered     Place sequential compression device  Until discontinued         12/11/23 1904                    Discharge Planning   KARSTEN:      Code Status: Full Code   Is the patient medically ready for discharge?:     Reason for patient still in hospital (select all that apply): Treatment                 Kolton Kline MD  Department of Hospital Medicine   [facility]

## 2023-12-12 NOTE — ASSESSMENT & PLAN NOTE
Continue lisinopril 2.5 mg daily.  Patient missed dose this morning.  Hydralazine p.r.n. for SBP >140  Obtain EKG and echo.

## 2023-12-12 NOTE — ED PROVIDER NOTES
Source of History:  Patient, spouse, chart    Chief complaint:  ABNORMAL MRI      HPI:  Marianela Potter is a 58 y.o. female with medical history of PE (on Coumadin), tinnitus, vertigo presenting with abnormal MRI.  Patient has had headache, vertigo, tinnitus and short term memory loss since October.  Patient states she flew to [place] and had hypertension while there.  Patient states since that time she is had her symptoms.  Patient states she was followed by her PCP as well as ENT.  ENT scheduled an outpatient MRI due to ongoing headache.  Patient states she was called today due to abnormal MRI.  Patient found to have small bilateral subdural hematomas, larger on the right, likely subacute. Slight right to left midline shift.  Patient denies any worsening of symptoms.  Patient states she is on Coumadin and last dose was last night.    This is the extent to the patients complaints today here in the emergency department.    ROS: As per HPI and below:  Constitutional: No fever.  No chills.  Eyes: No visual changes.  ENT: No sore throat. No ear pain.  Positive for sinus pressure.  Cardiovascular: No chest pain.  Respiratory: No shortness of breath.  GI: No abdominal pain.  No nausea or vomiting.  Genitourinary: No abnormal urination.  Neurologic:  Positive for headache.  Positive for tinnitus.  Positive for memory loss.  Positive for vertigo  MSK: no back pain.  Integument: No rashes or lesions.  Hematologic: No easy bruising.  Endocrine: No excessive thirst or urination.    Review of patient's allergies indicates:   Allergen Reactions    Ketorolac     Morphine        PMH:  As per HPI and below:  Past Medical History:   Diagnosis Date    Clotting disorder     Gastric paresis     Hypercoagulable state      Past Surgical History:   Procedure Laterality Date    CHOLECYSTECTOMY      COLON SURGERY      HYSTERECTOMY      ILEOSTOMY      STOMACH SURGERY      superior Mesenteric Artery Syndrome      TOTAL  "ABDOMINAL HYSTERECTOMY         Social History     Tobacco Use    Smoking status: Never   Substance Use Topics    Alcohol use: No    Drug use: No       Physical Exam:    BP (!) 192/99   Pulse 99   Temp 98.2 °F (36.8 °C) (Oral)   Resp 16   Ht 5' 6" (1.676 m)   Wt 52.2 kg (115 lb)   SpO2 100%   BMI 18.56 kg/m²   Nursing note and vital signs reviewed.  Constitutional: No acute distress.  Nontoxic.  Hypertensive and tachycardic on initial exam.  All other vital signs within normal limits.  Eyes: No conjunctival injection.  Extraocular muscles are intact.  Pupils equal round reactive 4-3 mm.  No nystagmus noted.  ENT: Oropharynx clear.  Mucous membranes pink and moist.  Cardiovascular: Rapid, regular rate and rhythm.  No murmurs. No gallops. No rubs.  Respiratory: Clear to auscultation bilaterally.  Good air movement.  No wheezes.  No rhonchi. No rales. No accessory muscle use.  Abdomen: Soft.  Not distended.  Nontender.  No guarding.  No rebound. Non-peritoneal.  Musculoskeletal: Good range of motion all joints.  No deformities.  Neck supple.  No meningismus.  Skin: No rashes seen.  Good turgor.  No abrasions.  No ecchymoses.  Neurologic:  Cranial nerves 2-12 are intact.  All extremities are 5/5 strength.  Motor and sensory intact.  No ataxia.  Negative Romberg test.  No cerebellar findings.  Good finger-to-nose.  No focal neurological deficits. Steady gait appreciated.    Psych:  Appropriate, conversant    MDM    Emergent evaluation of a 57 yo female presenting for abnormal MRI.  Patient had an outpatient MRI today which showed small bilateral subdural hematomas, larger on the right, likely subacute. Slight right to left midline shift.  Patient states she is had chronic headache, tinnitus, right worse than left hearing loss and vertigo since October.  Patient has had multiple outpatient workups an MRI was scheduled by ENT.  Patient was called today due to abnormal findings.  Patient denies any worsening symptoms. "  On exam pt is A&Ox3.  Hypertensive and tachycardic on initial exam.  All other vital signs stable.  Pupils equal round reactive 4-3 mm.  No nystagmus noted.. Nonfebrile and nontoxic appearing.  Mucous membranes pink and moist. Breath sounds clear bilaterally.  Abdomen soft and nontender. No rebound or guarding appreciated on exam.   BS WNL.  Pt speaking in full sentences.  Steady gait appreciated. Cap refill < 3 seconds.  Cranial nerves 2-12 intact.  No focal neuro deficits noted.  Strength 5/5 in all extremities.    History Acquisition   Additional history was acquired from other historians.  Chart, spouse    The patient's list of active medical problems, social history, medications, and allergies as documented per RN staff has been reviewed.     Differential Diagnoses   Based on available information and the initial assessment, the working differential diagnoses considered during this evaluation include but are not limited to hypertensive urgency, hypertensive emergency, subdural, subarachnoid, medication complication, others.    I will get labs, discussed case with neuro surgery and reassess.  I discussed case with my supervising physician.      LABS     Labs Reviewed   PROTIME-INR - Abnormal; Notable for the following components:       Result Value    Prothrombin Time 24.7 (*)     INR 2.3 (*)     All other components within normal limits   URINALYSIS, REFLEX TO URINE CULTURE - Abnormal; Notable for the following components:    Color, UA Colorless (*)     Occult Blood UA 2+ (*)     All other components within normal limits    Narrative:     Specimen Source->Urine   URINALYSIS MICROSCOPIC - Abnormal; Notable for the following components:    Hyaline Casts, UA 0.00 (*)     All other components within normal limits    Narrative:     Specimen Source->Urine   COMPREHENSIVE METABOLIC PANEL   APTT   HEMOGLOBIN A1C   TSH   CBC W/ AUTO DIFFERENTIAL   HEMOGLOBIN A1C   TSH     EKG        Additional Consideration   All  available testing was considered during the course of this workup.  Comorbidities taken into consideration during the patient's evaluation and treatment include weight, age.    Social determinants of health were taken into consideration during development of our treatment plan.    Medications   phytonadione vitamin k (AQUA-MEPHYTON) 10 mg in dextrose 5 % (D5W) 50 mL IVPB (10 mg Intravenous New Bag 12/11/23 1938)   hydrALAZINE injection 10 mg (has no administration in time range)   lisinopriL tablet 2.5 mg (2.5 mg Oral Not Given 12/11/23 1900)   ALPRAZolam tablet 1 mg (has no administration in time range)   oxyCODONE-acetaminophen 5-325 mg per tablet 1 tablet (has no administration in time range)   HYDROmorphone injection 0.5 mg (has no administration in time range)   pantoprazole EC tablet 40 mg (has no administration in time range)   sodium chloride 0.9% flush 10 mL (10 mLs Intravenous Given 12/11/23 1915)   acetaminophen tablet 650 mg (has no administration in time range)   ondansetron injection 4 mg (has no administration in time range)   human prothrombin complex (PCC) (KCENTRA) 1,305 Units in sterile water for injection IVPB (has no administration in time range)   lactated ringers infusion (has no administration in time range)   hydrALAZINE injection 10 mg (10 mg Intravenous Given 12/11/23 1939)      ED Course as of 12/11/23 1947   Mon Dec 11, 2023   1825 Discussed case with Neurosurgery, recommends frequent neuro checks and repeat CT head in the morning.  Will discussed case with hospital medicine for admission. [RZ]   1839 Discussed case with hospital medicine.  Will admit for further evaluation and treatment.  Awaiting bed assignment. [RZ]      ED Course User Index  [RZ] Janet Alvarado NP       Critical Care    Date/Time: 12/11/2023 6:49 PM    Performed by: Janet Alvarado NP  Authorized by: Graeem Arnold MD  Direct patient critical care time: 10 minutes  Additional history critical care time: 10  minutes  Ordering / reviewing critical care time: 10 minutes  Documentation critical care time: 5 minutes  Consulting other physicians critical care time: 5 minutes  Consult with family critical care time: 5 minutes  Other critical care time: 0 minutes  Total critical care time (exclusive of procedural time) : 45 minutes  Critical care time was exclusive of separately billable procedures and treating other patients and teaching time.  Critical care was necessary to treat or prevent imminent or life-threatening deterioration of the following conditions: CNS failure or compromise.  Critical care was time spent personally by me on the following activities: development of treatment plan with patient or surrogate, discussions with consultants, interpretation of cardiac output measurements, evaluation of patient's response to treatment, examination of patient, ordering and performing treatments and interventions, obtaining history from patient or surrogate, ordering and review of laboratory studies, ordering and review of radiographic studies, pulse oximetry, re-evaluation of patient's condition and review of old charts.                 CLINICAL IMPRESSION  1. Subdural hematoma    2. Bilateral subdural hematomas    3. Intractable headache, unspecified chronicity pattern, unspecified headache type    4. Tinnitus, unspecified laterality    5. Tachycardia    6. SDH (subdural hematoma)         ED Disposition Condition    Admit Stable          This note was created using dictation software.  This program may occasionally mistype words and phrases.         Janet Alvarado NP  12/11/23 1947

## 2023-12-12 NOTE — H&P
[facility] - Emergency Dept  Hospital Medicine  History & Physical    Patient Name: Marianela Potter  MRN: [id number]  Patient Class: IP- Inpatient  Admission Date: 12/11/2023  Attending Physician: Gian Jacobs MD   Primary Care Provider: Sage Gallardo MD         Patient information was obtained from patient, relative(s), past medical records, and ER records.     Subjective:     Principal Problem:Non-traumatic subdural hematoma (SDH)    Chief Complaint:   Chief Complaint   Patient presents with    ABNORMAL MRI        HPI: Patient is a 58-year-old male with history of PE on warfarin, hypertension, and GERD who presents after an abnormal MRI of head today.  Since October, patient has had a headache, vertigo, tinnitus, and some memory loss.  Patient says she initially thought she had a cold.  The headaches have been worsening.  Patient had a CT of the head on November 1st which was normal.  Patient had an outpatient MRI brain today which showed small bilateral subdural hematomas, larger on the right, likely subacute. Slight right to left midline shift.  Patient denies any fevers, chills, chest pain, or shortness for breath.  Patient checks her INR at home weekly and it is usually in the low 2s.  Patient denies having any elevated INRs.  INR last night was 2.3.  Neurosurgery and Heme-Onc consulted.  Labs are pending.    Past Medical History:   Diagnosis Date    Clotting disorder     Gastric paresis     Hypercoagulable state        Past Surgical History:   Procedure Laterality Date    CHOLECYSTECTOMY      COLON SURGERY      HYSTERECTOMY      ILEOSTOMY      STOMACH SURGERY      superior Mesenteric Artery Syndrome      TOTAL ABDOMINAL HYSTERECTOMY         Review of patient's allergies indicates:   Allergen Reactions    Ketorolac     Morphine        No current facility-administered medications on file prior to encounter.     Current Outpatient Medications on File Prior to Encounter   Medication Sig     alprazolam (XANAX) 1 MG tablet Take 2 mg by mouth nightly as needed for Anxiety.    HYDROcodone-acetaminophen (NORCO) 7.5-325 mg per tablet Take 1 tablet by mouth every 12 (twelve) hours as needed for Pain.     metoclopramide HCl (REGLAN) 10 MG tablet Take 10 mg by mouth before meals and at bedtime as needed (nausea).     pantoprazole (PROTONIX) 40 MG tablet Take 40 mg by mouth once daily.    warfarin (COUMADIN) 4 MG tablet Take 1 tablet (4 mg total) by mouth Daily.     Family History    None       Tobacco Use    Smoking status: Never    Smokeless tobacco: Not on file   Substance and Sexual Activity    Alcohol use: No    Drug use: No    Sexual activity: Not on file     Review of Systems   Constitutional: Negative.    HENT:  Positive for hearing loss and tinnitus.    Eyes: Negative.    Respiratory: Negative.     Cardiovascular: Negative.    Gastrointestinal: Negative.    Endocrine: Negative.    Genitourinary: Negative.    Musculoskeletal: Negative.    Skin: Negative.    Neurological:  Positive for dizziness and headaches. Negative for tremors, seizures, syncope, facial asymmetry, speech difficulty, weakness, light-headedness and numbness.   Hematological: Negative.    Psychiatric/Behavioral: Negative.       Objective:     Vital Signs (Most Recent):  Temp: 98.2 °F (36.8 °C) (12/11/23 1814)  Pulse: 99 (12/11/23 1851)  Resp: 16 (12/11/23 1812)  BP: (!) 192/99 (12/11/23 1851)  SpO2: 100 % (12/11/23 1851) Vital Signs (24h Range):  Temp:  [98.2 °F (36.8 °C)] 98.2 °F (36.8 °C)  Pulse:  [] 99  Resp:  [16] 16  SpO2:  [99 %-100 %] 100 %  BP: (173-192)/() 192/99     Weight: 52.2 kg (115 lb)  Body mass index is 18.56 kg/m².     Physical Exam  Vitals and nursing note reviewed.   Constitutional:       General: She is not in acute distress.     Appearance: Normal appearance.   HENT:      Head: Normocephalic and atraumatic.      Nose: Nose normal.   Eyes:      Extraocular Movements: Extraocular movements intact.       Conjunctiva/sclera: Conjunctivae normal.      Pupils: Pupils are equal, round, and reactive to light.   Cardiovascular:      Rate and Rhythm: Regular rhythm. Tachycardia present.   Pulmonary:      Effort: Pulmonary effort is normal. No respiratory distress.      Breath sounds: Normal breath sounds. No stridor. No wheezing or rales.   Abdominal:      General: Bowel sounds are normal. There is no distension.      Palpations: Abdomen is soft.      Tenderness: There is no abdominal tenderness.   Musculoskeletal:         General: No swelling or tenderness. Normal range of motion.      Cervical back: Normal range of motion and neck supple.      Right lower leg: No edema.      Left lower leg: No edema.   Skin:     General: Skin is warm and dry.   Neurological:      General: No focal deficit present.      Mental Status: She is alert and oriented to person, place, and time.   Psychiatric:         Mood and Affect: Mood normal.         Behavior: Behavior normal.         Thought Content: Thought content normal.         Judgment: Judgment normal.              CRANIAL NERVES     CN III, IV, VI   Pupils are equal, round, and reactive to light.       Significant Labs: All pertinent labs within the past 24 hours have been reviewed.  Recent Lab Results       None            Significant Imaging: I have reviewed all pertinent imaging results/findings within the past 24 hours.  Assessment/Plan:     * Non-traumatic subdural hematoma (SDH)  MRI brain today which showed small bilateral subdural hematomas, larger on the right, likely subacute. Slight right to left midline shift.   Neurosurgery consulted who recommends repeating CT head in a.m..    Monitor in ICU.  Neuro checks q1 hours.  Resume home lisinopril and hydralazine p.r.n. for SBP >140  Labs pending including INR.  INR daily.  Holding warfarin.  Heme-Onc consulted.      History of pulmonary embolus (PE)  Holding warfarin in setting of SDH.  INR daily.  Heme-Onc  consulted.      Hypertension  Continue lisinopril 2.5 mg daily.  Patient missed dose this morning.  Hydralazine p.r.n. for SBP >140  Obtain EKG and echo.    GERD (gastroesophageal reflux disease)  PPI        VTE Risk Mitigation (From admission, onward)           Ordered     Place sequential compression device  Until discontinued         12/11/23 1904                                    Gian Jacobs MD  Department of Hospital Medicine  [facility] - Emergency Dept

## 2023-12-13 VITALS
HEART RATE: 80 BPM | SYSTOLIC BLOOD PRESSURE: 139 MMHG | BODY MASS INDEX: 19.67 KG/M2 | HEIGHT: 66 IN | WEIGHT: 122.38 LBS | OXYGEN SATURATION: 100 % | TEMPERATURE: 98 F | RESPIRATION RATE: 18 BRPM | DIASTOLIC BLOOD PRESSURE: 78 MMHG

## 2023-12-13 PROBLEM — R51.9 INTRACTABLE HEADACHE: Status: ACTIVE | Noted: 2023-12-13

## 2023-12-13 LAB
ALBUMIN SERPL BCP-MCNC: 3.7 G/DL (ref 3.5–5.2)
ALP SERPL-CCNC: 62 U/L (ref 55–135)
ALT SERPL W/O P-5'-P-CCNC: 13 U/L (ref 10–44)
ANION GAP SERPL CALC-SCNC: 3 MMOL/L (ref 8–16)
AST SERPL-CCNC: 14 U/L (ref 10–40)
BILIRUB SERPL-MCNC: 0.9 MG/DL (ref 0.1–1)
BUN SERPL-MCNC: 7 MG/DL (ref 6–20)
CALCIUM SERPL-MCNC: 9.2 MG/DL (ref 8.7–10.5)
CHLORIDE SERPL-SCNC: 109 MMOL/L (ref 95–110)
CO2 SERPL-SCNC: 27 MMOL/L (ref 23–29)
CREAT SERPL-MCNC: 0.6 MG/DL (ref 0.5–1.4)
ERYTHROCYTE [DISTWIDTH] IN BLOOD BY AUTOMATED COUNT: 12.5 % (ref 11.5–14.5)
EST. GFR  (NO RACE VARIABLE): >60 ML/MIN/1.73 M^2
GLUCOSE SERPL-MCNC: 92 MG/DL (ref 70–110)
HCT VFR BLD AUTO: 37.2 % (ref 37–48.5)
HGB BLD-MCNC: 12.2 G/DL (ref 12–16)
INR PPP: 0.9 (ref 0.8–1.2)
MCH RBC QN AUTO: 30.8 PG (ref 27–31)
MCHC RBC AUTO-ENTMCNC: 32.8 G/DL (ref 32–36)
MCV RBC AUTO: 94 FL (ref 82–98)
PLATELET # BLD AUTO: 248 K/UL (ref 150–450)
PMV BLD AUTO: 9.3 FL (ref 9.2–12.9)
POTASSIUM SERPL-SCNC: 3.6 MMOL/L (ref 3.5–5.1)
PROT SERPL-MCNC: 6.4 G/DL (ref 6–8.4)
PROTHROMBIN TIME: 10.5 SEC (ref 9–12.5)
RBC # BLD AUTO: 3.96 M/UL (ref 4–5.4)
SODIUM SERPL-SCNC: 139 MMOL/L (ref 136–145)
WBC # BLD AUTO: 6.92 K/UL (ref 3.9–12.7)

## 2023-12-13 PROCEDURE — 94761 N-INVAS EAR/PLS OXIMETRY MLT: CPT

## 2023-12-13 PROCEDURE — 36415 COLL VENOUS BLD VENIPUNCTURE: CPT | Performed by: STUDENT IN AN ORGANIZED HEALTH CARE EDUCATION/TRAINING PROGRAM

## 2023-12-13 PROCEDURE — 85027 COMPLETE CBC AUTOMATED: CPT | Performed by: STUDENT IN AN ORGANIZED HEALTH CARE EDUCATION/TRAINING PROGRAM

## 2023-12-13 PROCEDURE — 25000003 PHARM REV CODE 250: Performed by: STUDENT IN AN ORGANIZED HEALTH CARE EDUCATION/TRAINING PROGRAM

## 2023-12-13 PROCEDURE — 99900035 HC TECH TIME PER 15 MIN (STAT)

## 2023-12-13 PROCEDURE — A4216 STERILE WATER/SALINE, 10 ML: HCPCS | Performed by: STUDENT IN AN ORGANIZED HEALTH CARE EDUCATION/TRAINING PROGRAM

## 2023-12-13 PROCEDURE — 99900031 HC PATIENT EDUCATION (STAT)

## 2023-12-13 PROCEDURE — 85610 PROTHROMBIN TIME: CPT | Performed by: STUDENT IN AN ORGANIZED HEALTH CARE EDUCATION/TRAINING PROGRAM

## 2023-12-13 PROCEDURE — 80053 COMPREHEN METABOLIC PANEL: CPT | Performed by: STUDENT IN AN ORGANIZED HEALTH CARE EDUCATION/TRAINING PROGRAM

## 2023-12-13 PROCEDURE — 99223 1ST HOSP IP/OBS HIGH 75: CPT | Mod: ,,, | Performed by: INTERNAL MEDICINE

## 2023-12-13 RX ORDER — LANOLIN ALCOHOL/MO/W.PET/CERES
800 CREAM (GRAM) TOPICAL
Status: DISCONTINUED | OUTPATIENT
Start: 2023-12-13 | End: 2023-12-13 | Stop reason: HOSPADM

## 2023-12-13 RX ORDER — SODIUM,POTASSIUM PHOSPHATES 280-250MG
2 POWDER IN PACKET (EA) ORAL
Status: DISCONTINUED | OUTPATIENT
Start: 2023-12-13 | End: 2023-12-13 | Stop reason: HOSPADM

## 2023-12-13 RX ADMIN — ACETAMINOPHEN 650 MG: 325 TABLET ORAL at 05:12

## 2023-12-13 RX ADMIN — LISINOPRIL 2.5 MG: 2.5 TABLET ORAL at 02:12

## 2023-12-13 RX ADMIN — MUPIROCIN 1 G: 20 OINTMENT TOPICAL at 09:12

## 2023-12-13 RX ADMIN — PANTOPRAZOLE SODIUM 40 MG: 40 TABLET, DELAYED RELEASE ORAL at 05:12

## 2023-12-13 RX ADMIN — POTASSIUM BICARBONATE 50 MEQ: 977.5 TABLET, EFFERVESCENT ORAL at 06:12

## 2023-12-13 RX ADMIN — SODIUM CHLORIDE 10 ML: 9 INJECTION INTRAMUSCULAR; INTRAVENOUS; SUBCUTANEOUS at 09:12

## 2023-12-13 NOTE — HOSPITAL COURSE
12/13:  No acute events overnight.  INR 0.9.  Patient was found out of bed performing hygiene tasks independently.  She looks much better today versus yesterday.  She reports headaches symptoms have improved.  She denies any visual disturbances, weakness, numbness, or tingling and would like to go home today.  She prefer surveillance management for bilateral subdural hematoma and has declined any surgical intervention or a [place] filter at this time .

## 2023-12-13 NOTE — PHYSICIAN QUERY
PT Name: Marianela Potter  MR #: [id number]    DOCUMENTATION CLARIFICATION     CDS/: Guillermina Goodwin               Contact information:[phone] or through epic messenger    This form is a permanent document in the medical record.     Query Date: December 13, 2023    By submitting this query, we are merely seeking further clarification of documentation. Please utilize your independent clinical judgment when addressing the question(s) below.    Supporting Clinical Findings Location in Medical Record    Patient had a CT of the head on November 1st which was normal.  Patient had an outpatient MRI brain today which showed small bilateral subdural hematomas, larger on the right,  likely subacute    Patient denies having any elevated INRs.  INR last night  was 2.3.   HP   presents with spontaneous bilateral convexity acute on chronic subdural hematomas in the setting hypertensive urgency and long-term Coumadin for  hypercoagulable state and pulmonary embolism x2     I advised continue holding the Coumadin.  She is aware of the risk of recurrent DVT/PE.  I advised consider Santi filter placement while off anticoagulation.  Neurosurgery consult       Provider, please clarify if there is any clinical correlation between Nontraumatic subdural hematomas and Coumadin.           Are the conditions:      [ x ] Due to or associated with each other   [  ] Unrelated to each other   [  ] Clinically Undetermined                                                                               Please document in your progress notes daily for the duration of treatment until resolved and include in your discharge summary.

## 2023-12-13 NOTE — SUBJECTIVE & OBJECTIVE
Interval History: 12/13:  No acute events overnight.  INR 0.9.  Patient was found out of bed performing hygiene tasks independently.  She looks much better today versus yesterday.  She reports headaches symptoms have improved.  She denies any visual disturbances, weakness, numbness, or tingling and would like to go home today.  She prefer surveillance management for bilateral subdural hematoma and has declined any surgical intervention or a [place] filter at this time .    Medications:  Continuous Infusions:   lactated ringers Stopped (12/13/23 0800)     Scheduled Meds:   lisinopriL  2.5 mg Oral Daily    mupirocin   Nasal BID    pantoprazole  40 mg Oral Daily    sodium chloride 0.9%  10 mL Intravenous Q12H     PRN Meds:acetaminophen, ALPRAZolam, hydrALAZINE, HYDROmorphone, magnesium oxide, magnesium oxide, metoclopramide, ondansetron, oxyCODONE-acetaminophen, potassium bicarbonate, potassium bicarbonate, potassium bicarbonate, potassium, sodium phosphates, potassium, sodium phosphates, potassium, sodium phosphates, promethazine       Objective:     Weight: 55.5 kg (122 lb 5.7 oz)  Body mass index is 19.75 kg/m².  Vital Signs (Most Recent):  Temp: 97.4 °F (36.3 °C) (12/13/23 0800)  Pulse: 84 (12/13/23 1100)  Resp: 20 (12/13/23 1100)  BP: (!) 158/85 (12/13/23 1100)  SpO2: 100 % (12/13/23 0900) Vital Signs (24h Range):  Temp:  [97.4 °F (36.3 °C)-98.5 °F (36.9 °C)] 97.4 °F (36.3 °C)  Pulse:  [71-97] 84  Resp:  [14-27] 20  SpO2:  [96 %-100 %] 100 %  BP: ()/(51-85) 158/85     Date 12/13/23 0700 - 12/14/23 0659   Shift 1147-5698 4142-7762 2341-4842 24 Hour Total   INTAKE   P.O. 150   150   I.V.(mL/kg) 100(1.8)   100(1.8)   Shift Total(mL/kg) 250(4.5)   250(4.5)   OUTPUT   Shift Total(mL/kg)       Weight (kg) 55.5 55.5 55.5 55.5                            Ileostomy 04/29/21 0357 RLQ (Active)   Stoma Appearance pink;moist 12/13/23 0701   Appliance no leakage;per patient home schedule 12/13/23 0701   Stoma Function  stool;flatus 12/13/23 0701   Peristomal Assessment Clean;Intact 12/13/23 0701   Tolerance no signs/symptoms of discomfort;independent w/ appliance change;independent w/ stoma care 12/13/23 0701         Neurosurgery Physical Exam  General: well developed, well nourished, no distress.   Head: normocephalic, atraumatic  Neck: No tracheal deviation.  Neurologic: Alert and oriented. Thought content appropriate.  GCS: E4 V5 M6; Total: 15  Mental Status: Awake, Alert, Oriented x 4  Language: No aphasia  Speech: No dysarthria  Cranial nerves: face symmetric, tongue midline, CN II-XII grossly intact.   Pulmonary: normal respirations, no signs of respiratory distress  Skin: Skin is warm, dry and intact.    Sensory: intact to light touch throughout  Motor Strength: Moves all extremities spontaneously with good tone. No abnormal movements seen.       Significant Labs:  Recent Labs   Lab 12/11/23 1902 12/12/23 0231 12/13/23 0337   GLU 94 106 92    141 139   K 4.4 3.7 3.6    107 109   CO2 27 28 27   BUN 10 10 7   CREATININE 0.7 0.6 0.6   CALCIUM 8.7 9.1 9.2     Recent Labs   Lab 12/11/23 1955 12/12/23 0232 12/13/23 0337   WBC 6.48 13.56* 6.92   HGB 12.5 13.1 12.2   HCT 37.5 39.5 37.2    261 248     Recent Labs   Lab 12/11/23 1902 12/12/23 0232 12/13/23 0337   INR 2.3* 1.0 0.9   APTT 28.2  --   --      Microbiology Results (last 7 days)       ** No results found for the last 168 hours. **

## 2023-12-13 NOTE — CARE UPDATE
12/13/23 0725   Patient Assessment/Suction   Respiratory Effort Unlabored   Expansion/Accessory Muscles/Retractions expansion symmetric;no retractions;no use of accessory muscles   Rhythm/Pattern, Respiratory depth regular;pattern regular;unlabored   PRE-TX-O2   Device (Oxygen Therapy) room air   SpO2 99 %   Pulse Oximetry Type Continuous   $ Pulse Oximetry - Multiple Charge Pulse Oximetry - Multiple   Pulse 87   Resp 20   Education   $ Education 15 min   Respiratory Evaluation   $ Care Plan Tech Time 15 min

## 2023-12-13 NOTE — ASSESSMENT & PLAN NOTE
Ms. Marianela Potter 58-year-old female with past medical history ulcerative colitis, TIA, hypertension, mitral valve prolapse, GERD, bilateral hearing loss, tinnitus, prothrombin gene mutation, homozygous MTHFR mutation, PE x2 treated with Coumadin.  Patient presented to the emergency department on 12/11/2023 after being prompted by ENT due to an abnormal MRI which revealed bilateral subdural hematoma.     Neurologically intact.    Repeat head CT this a.m. demonstrates stable bilateral subacute to chronic subdural fluid collections     --Patient admitted to [address] neurochecks in ICU  --All labs and diagnostics reviewed.  INR 0.9  --Recommend holding Warfarin at this time, patient is aware of risk of DVT/PE if warfarin held  --Patient will follow-up in outpatient clinic with head CT in 2 weeks.

## 2023-12-13 NOTE — DISCHARGE SUMMARY
[facility] Medicine  Discharge Summary      Patient Name: Marianela Potter  MRN: [id number]  IDA: [id number]  Patient Class: IP- Inpatient  Admission Date: 12/11/2023  Hospital Length of Stay: 2 days  Discharge Date and Time:  12/13/2023 3:21 PM  Attending Physician: Kolton Kline MD   Discharging Provider: Kotlon Kline MD  Primary Care Provider: Sage Gallardo MD    Primary Care Team: Networked reference to record PCT     HPI:   Patient is a 58-year-old male with history of PE on warfarin, hypertension, and GERD who presents after an abnormal MRI of head today.  Since October, patient has had a headache, vertigo, tinnitus, and some memory loss.  Patient says she initially thought she had a cold.  The headaches have been worsening.  Patient had a CT of the head on November 1st which was normal.  Patient had an outpatient MRI brain today which showed small bilateral subdural hematomas, larger on the right, likely subacute. Slight right to left midline shift.  Patient denies any fevers, chills, chest pain, or shortness for breath.  Patient checks her INR at home weekly and it is usually in the low 2s.  Patient denies having any elevated INRs.  INR last night was 2.3.  Neurosurgery and Heme-Onc consulted.  Labs are pending.    * No surgery found *      Hospital Course:   Patient is the 58 years old admitted to the hospital for subdural hematoma on MRI.  Neurosurgery consult and repeat CT scan Acute subdural hemorrhage within chronic appearing subdural collections bilaterally each measure up to 0.8 cm.  As per neurosurgeon, continue monitor the patient in ICU.  Patient repeat CT scan head today IMPRESSION: Stable bilateral subacute to chronic subdural fluid collections .  From neurosurgery standpoint the patient can be discharged home.  We will hold Coumadin for now.  Patient needs to follow up with Neurosurgery and Hematology outpatient.  Offer IVC filter but patient refused.  Will  discharge home when hematologist clear    Physical Exam  Constitutional:       General: She is not in acute distress.  HENT:      Head: Normocephalic and atraumatic.      Nose: No congestion.   Eyes:      General: No scleral icterus.        Right eye: No discharge.         Left eye: No discharge.      Extraocular Movements: Extraocular movements intact.   Cardiovascular:      Rate and Rhythm: Normal rate and regular rhythm.   Pulmonary:      Effort: Pulmonary effort is normal.      Breath sounds: Normal breath sounds.   Abdominal:      General: Abdomen is flat. Bowel sounds are normal.   Musculoskeletal:         General: No swelling or tenderness.      Cervical back: Normal range of motion and neck supple. No rigidity.   Skin:     General: Skin is warm.   Neurological:      General: No focal deficit present.      Mental Status: She is alert and oriented to person, place, and time.   Psychiatric:         Mood and Affect: Mood normal.        Goals of Care Treatment Preferences:  Code Status: Full Code      Consults:   Consults (From admission, onward)          Status Ordering Provider     Inpatient consult to Hematology Oncology  Once        Provider:  Catrachito Sharpe MD    Acknowledged TEN GEE     Inpatient consult to Neurosurgery  Once        Provider:  Wyatt Townsend DO    Acknowledged NEISHA SEALS            No new Assessment & Plan notes have been filed under this hospital service since the last note was generated.  Service: Hospital Medicine    Final Active Diagnoses:    Diagnosis Date Noted POA    PRINCIPAL PROBLEM:  Non-traumatic subdural hematoma (SDH) [I62.00] 12/11/2023 Yes    Hypertension [I10] 12/11/2023 Yes    GERD (gastroesophageal reflux disease) [K21.9] 12/11/2023 Yes    History of pulmonary embolus (PE) [Z86.711] 01/18/2019 Yes      Problems Resolved During this Admission:       Discharged Condition: good    Disposition:     Follow Up:   Follow-up Information       Sage Gallardo MD  Follow up.    Specialty: Internal Medicine  Contact information:  [address] [place] [address] [phone]                           Patient Instructions:   No discharge procedures on file.    Significant Diagnostic Studies: Labs: CMP   Recent Labs   Lab 12/11/23  1902 12/12/23 0231 12/13/23  0337    141 139   K 4.4 3.7 3.6    107 109   CO2 27 28 27   GLU 94 106 92   BUN 10 10 7   CREATININE 0.7 0.6 0.6   CALCIUM 8.7 9.1 9.2   PROT 7.5 6.9 6.4   ALBUMIN 4.2 4.0 3.7   BILITOT 0.4 0.6 0.9   ALKPHOS 74 67 62   AST 23 16 14   ALT 14 13 13   ANIONGAP 13 6* 3*    and CBC   Recent Labs   Lab 12/11/23 1955 12/12/23 0232 12/13/23  0337   WBC 6.48 13.56* 6.92   HGB 12.5 13.1 12.2   HCT 37.5 39.5 37.2    261 248       Pending Diagnostic Studies:       None           Medications:  Reconciled Home Medications:      Medication List        CONTINUE taking these medications      ALPRAZolam 1 MG tablet  Commonly known as: XANAX  Take 2 mg by mouth nightly as needed for Anxiety.     azelastine 137 mcg (0.1 %) nasal spray  Commonly known as: ASTELIN  1 spray by Nasal route 2 (two) times daily.     cetirizine 10 MG tablet  Commonly known as: ZYRTEC  Take 1 tablet by mouth once daily.     estradioL 0.01 % (0.1 mg/gram) vaginal cream  Commonly known as: ESTRACE  Place 1 g vaginally twice a week.     fluticasone propionate 50 mcg/actuation nasal spray  Commonly known as: FLONASE  1 spray by Each Nostril route once daily.     HYDROcodone-acetaminophen 7.5-325 mg per tablet  Commonly known as: NORCO  Take 1 tablet by mouth every 12 (twelve) hours as needed for Pain.     lisinopriL 2.5 MG tablet  Commonly known as: PRINIVIL,ZESTRIL  Take 1 tablet by mouth once daily.     metoclopramide HCl 10 MG tablet  Commonly known as: REGLAN  Take 10 mg by mouth before meals and at bedtime as needed (nausea).     pantoprazole 40 MG tablet  Commonly known as: PROTONIX  Take 40 mg by mouth once daily.     UBRELVY 100 mg  tablet  Generic drug: ubrogepant  Take 100 mg by mouth as needed for Migraine.     valACYclovir 500 MG tablet  Commonly known as: VALTREX  Take 1 tablet by mouth once daily.            STOP taking these medications      warfarin 4 MG tablet  Commonly known as: COUMADIN              Indwelling Lines/Drains at time of discharge:   Lines/Drains/Airways       Drain  Duration                  Ileostomy 04/29/21 0357  days                    Time spent on the discharge of patient: 31 minutes      Kolton Kline MD  Department of Hospital Medicine  [facility]

## 2023-12-13 NOTE — PROGRESS NOTES
[facility]  Neurosurgery  Progress Note    Subjective:     History of Present Illness: Ms. Marianela Potter 58-year-old female with past medical history ulcerative colitis, TIA, hypertension, mitral valve prolapse, GERD, bilateral hearing loss, tinnitus, prothrombin gene mutation, homozygous MTHFR mutation, PE x2 treated with Coumadin.  Patient presented to the emergency department on 12/11/2023 after being prompted by ENT due to an abnormal MRI which revealed bilateral subdural hematoma.  Patient reports in October of this year she began having vertigo, increased pressure in head, vibratory sensation in maxillary sinuses when speaking, hypersensitivity to noise and short-term memory loss.  Today, patient reports frontal head pressure which has slightly improved since admission.  Additionally patient reports she is on lisinopril for hypertension but does not take medication every day because it sometimes cause hypotension.  She states she does monitor her blood pressure at home and when it begins rising she takes her lisinopril.    INR 1.0    Of note, patient is followed by Dr. Charlton, hematologist.    Neurosurgery consulted.        Post-Op Info:  * No surgery found *       Interval History: 12/13:  No acute events overnight.  INR 0.9.  Patient was found out of bed performing hygiene tasks independently.  She looks much better today versus yesterday.  She reports headaches symptoms have improved.  She denies any visual disturbances, weakness, numbness, or tingling and would like to go home today.  She prefer surveillance management for bilateral subdural hematoma and has declined any surgical intervention or a [place] filter at this time .    Medications:  Continuous Infusions:   lactated ringers Stopped (12/13/23 0800)     Scheduled Meds:   lisinopriL  2.5 mg Oral Daily    mupirocin   Nasal BID    pantoprazole  40 mg Oral Daily    sodium chloride 0.9%  10 mL Intravenous Q12H     PRN  Meds:acetaminophen, ALPRAZolam, hydrALAZINE, HYDROmorphone, magnesium oxide, magnesium oxide, metoclopramide, ondansetron, oxyCODONE-acetaminophen, potassium bicarbonate, potassium bicarbonate, potassium bicarbonate, potassium, sodium phosphates, potassium, sodium phosphates, potassium, sodium phosphates, promethazine       Objective:     Weight: 55.5 kg (122 lb 5.7 oz)  Body mass index is 19.75 kg/m².  Vital Signs (Most Recent):  Temp: 97.4 °F (36.3 °C) (12/13/23 0800)  Pulse: 84 (12/13/23 1100)  Resp: 20 (12/13/23 1100)  BP: (!) 158/85 (12/13/23 1100)  SpO2: 100 % (12/13/23 0900) Vital Signs (24h Range):  Temp:  [97.4 °F (36.3 °C)-98.5 °F (36.9 °C)] 97.4 °F (36.3 °C)  Pulse:  [71-97] 84  Resp:  [14-27] 20  SpO2:  [96 %-100 %] 100 %  BP: ()/(51-85) 158/85     Date 12/13/23 0700 - 12/14/23 0659   Shift 8527-2046 6242-7899 5776-7846 24 Hour Total   INTAKE   P.O. 150   150   I.V.(mL/kg) 100(1.8)   100(1.8)   Shift Total(mL/kg) 250(4.5)   250(4.5)   OUTPUT   Shift Total(mL/kg)       Weight (kg) 55.5 55.5 55.5 55.5                            Ileostomy 04/29/21 0357 RLQ (Active)   Stoma Appearance pink;moist 12/13/23 0701   Appliance no leakage;per patient home schedule 12/13/23 0701   Stoma Function stool;flatus 12/13/23 0701   Peristomal Assessment Clean;Intact 12/13/23 0701   Tolerance no signs/symptoms of discomfort;independent w/ appliance change;independent w/ stoma care 12/13/23 0701         Neurosurgery Physical Exam  General: well developed, well nourished, no distress.   Head: normocephalic, atraumatic  Neck: No tracheal deviation.  Neurologic: Alert and oriented. Thought content appropriate.  GCS: E4 V5 M6; Total: 15  Mental Status: Awake, Alert, Oriented x 4  Language: No aphasia  Speech: No dysarthria  Cranial nerves: face symmetric, tongue midline, CN II-XII grossly intact.   Pulmonary: normal respirations, no signs of respiratory distress  Skin: Skin is warm, dry and intact.    Sensory: intact to  light touch throughout  Motor Strength: Moves all extremities spontaneously with good tone. No abnormal movements seen.       Significant Labs:  Recent Labs   Lab 12/11/23 1902 12/12/23 0231 12/13/23  0337   GLU 94 106 92    141 139   K 4.4 3.7 3.6    107 109   CO2 27 28 27   BUN 10 10 7   CREATININE 0.7 0.6 0.6   CALCIUM 8.7 9.1 9.2     Recent Labs   Lab 12/11/23 1955 12/12/23 0232 12/13/23 0337   WBC 6.48 13.56* 6.92   HGB 12.5 13.1 12.2   HCT 37.5 39.5 37.2    261 248     Recent Labs   Lab 12/11/23 1902 12/12/23 0232 12/13/23 0337   INR 2.3* 1.0 0.9   APTT 28.2  --   --      Microbiology Results (last 7 days)       ** No results found for the last 168 hours. **            Assessment/Plan:     * Non-traumatic subdural hematoma (SDH)  Ms. Marianela Potter 58-year-old female with past medical history ulcerative colitis, TIA, hypertension, mitral valve prolapse, GERD, bilateral hearing loss, tinnitus, prothrombin gene mutation, homozygous MTHFR mutation, PE x2 treated with Coumadin.  Patient presented to the emergency department on 12/11/2023 after being prompted by ENT due to an abnormal MRI which revealed bilateral subdural hematoma.     Neurologically intact.    Repeat head CT this a.m. demonstrates stable bilateral subacute to chronic subdural fluid collections     --Patient admitted to [facility]    [facility] neurochecks in ICU  --All labs and diagnostics reviewed.  INR 0.9  --Recommend holding Warfarin at this time, patient is aware of risk of DVT/PE if warfarin held  --Patient will follow-up in outpatient clinic with head CT in 2 weeks.          SHANA HUBER PA-C  Neurosurgery  [facility]

## 2023-12-13 NOTE — PLAN OF CARE
[facility]  Initial Discharge Assessment       Primary Care Provider: Sage Gallardo MD    Admission Diagnosis: Subdural hematoma [S06.5XAA]  Subdural hematoma [S06.5XAA]    Admission Date: 12/11/2023  Expected Discharge Date:     Transition of Care Barriers: None      Initial assessment completed at bedside with pt. Pt anticipates discharge home when medically clear. Pt denies discharge needs.    Payor: MEDICARE / Plan: MEDICARE PART A & B / Product Type: Government /     Extended Emergency Contact Information  Primary Emergency Contact: Stephanie Oneil  Address: [address] [place] [address] [facility]  Home Phone: [phone]  Relation: Daughter    Discharge Plan A: Home  Discharge Plan B: Home      [facility] - [place], [place] - [address]  [address]  [place] [address]  Phone: [phone] Fax: [phone]    [id number] - [place], [place] - [address] [facility] [address]  [address]  [place] [address]  Phone: [phone] Fax: [phone]      Initial Assessment (most recent)       Adult Discharge Assessment - 12/13/23 1020          Discharge Assessment    Assessment Type Discharge Planning Assessment     Confirmed/corrected address, phone number and insurance Yes     Confirmed Demographics Correct on Facesheet     Source of Information patient     When was your last doctors appointment? 12/06/23     Reason For Admission SDH     People in Home child(tessa), adult     Facility Arrived From: Stephanie (daughter) [phone]     Do you expect to return to your current living situation? Yes     Do you have help at home or someone to help you manage your care at home? Yes     Who are your caregiver(s) and their phone number(s)? Stephanie (daughter) [phone]     Prior to hospitilization cognitive status: Alert/Oriented     Current cognitive status: Alert/Oriented     Walking or Climbing Stairs Difficulty no      Dressing/Bathing Difficulty no     Do you have any problems with: Errands/Grocery;Needs other help     Specify other help Stephanie (daughter) [phone]     Home Layout Able to live on 1st floor     Equipment Currently Used at Home none     Readmission within 30 days? No     Patient currently being followed by outpatient case management? No     Do you currently have service(s) that help you manage your care at home? No     Do you take prescription medications? Yes     Do you have prescription coverage? No     Coverage Good RX     Do you have any problems affording any of your prescribed medications? No     Is the patient taking medications as prescribed? yes     Who is going to help you get home at discharge? Stephanie (daughter) [phone]     How do you get to doctors appointments? family or friend will provide     Are you on dialysis? No     Do you take coumadin? Yes     Who monitors your labs? Dr Charlton     Discharge Plan A Home     Discharge Plan B Home     DME Needed Upon Discharge  none     Discharge Plan discussed with: Patient     Transition of Care Barriers None        Physical Activity    On average, how many days per week do you engage in moderate to strenuous exercise (like a brisk walk)? 0 days     On average, how many minutes do you engage in exercise at this level? 0 min        Financial Resource Strain    How hard is it for you to pay for the very basics like food, housing, medical care, and heating? Not hard at all        Housing Stability    In the last 12 months, was there a time when you were not able to pay the mortgage or rent on time? No     In the last 12 months, how many places have you lived? 1     In the last 12 months, was there a time when you did not have a steady place to sleep or slept in a shelter (including now)? No        Transportation Needs    In the past 12 months, has lack of transportation kept you from medical appointments or from getting medications? No     In the past 12  months, has lack of transportation kept you from meetings, work, or from getting things needed for daily living? No        Food Insecurity    Within the past 12 months, you worried that your food would run out before you got the money to buy more. Never true     Within the past 12 months, the food you bought just didn't last and you didn't have money to get more. Never true        Stress    Do you feel stress - tense, restless, nervous, or anxious, or unable to sleep at night because your mind is troubled all the time - these days? Only a little        Social Connections    In a typical week, how many times do you talk on the phone with family, friends, or neighbors? More than three times a week     How often do you get together with friends or relatives? More than three times a week     How often do you attend Hinduism or Mandaen services? More than 4 times per year     Do you belong to any clubs or organizations such as Hinduism groups, unions, fraternal or athletic groups, or school groups? No     How often do you attend meetings of the clubs or organizations you belong to? Never     Are you , , , , never , or living with a partner?         Alcohol Use    Q1: How often do you have a drink containing alcohol? Never     Q2: How many drinks containing alcohol do you have on a typical day when you are drinking? Patient does not drink     Q3: How often do you have six or more drinks on one occasion? Never
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
## 2023-12-14 DIAGNOSIS — I62.00 NONTRAUMATIC SUBDURAL HEMORRHAGE, UNSPECIFIED: Primary | ICD-10-CM

## 2023-12-14 NOTE — PLAN OF CARE
Received call from ICU nurse stating that Dr. Kline wanted pt to have SCD machine and sleeves for home use.  She is discharging tonight.  Order placed for home medical equipment (HME) in [employer] for the SCD machine and sleeves and the DME  on call tonight, Joel Schwarz, was called and notified that the order was placed.  Pt can go home tonight with just the JUSTEN hose, per ICU nurse, and the equipment is supposed to be delivered to pt's home tomorrow.  Updated pt's nurse.

## 2023-12-14 NOTE — PLAN OF CARE
POC reviewed. VSS, afebrile. Repeat heat CT WDL. Okay from neurosurgery and hemonc standpoint to dc home with TEDs/SCDs. Repeat CT in 2 weeks with nsrgy follow up. Follw up with Dr. Trevizo in 3 weeks. Pt to remain off of coumadin until then. JUSTEN hose on. Orders for home SCD placed. CM arranged SCD machine delivery for tomorrow. AVS reviewed, verbalizes understanding. DC to home with friend at 1945.     Problem: Adult Inpatient Plan of Care  Goal: Plan of Care Review  Outcome: Ongoing, Progressing

## 2023-12-14 NOTE — PLAN OF CARE
Patient cleared for discharge from case management standpoint.    Chart and discharge orders reviewed.  Patient discharged home with no further case management needs.     12/13/23 0336   Final Note   Assessment Type Final Discharge Note   Anticipated Discharge Disposition Home   Post-Acute Status   Discharge Delays None known at this time

## 2023-12-18 ENCOUNTER — TELEPHONE (OUTPATIENT)
Dept: HEMATOLOGY/ONCOLOGY | Facility: CLINIC | Age: 58
End: 2023-12-18

## 2023-12-18 NOTE — TELEPHONE ENCOUNTER
Spoke with GIOVANNA Bond in Case Management. Per Dr Trevizo, Norman hose are sufficient for patient to be sent home with.

## 2023-12-18 NOTE — PLAN OF CARE
12/18/23 1224   Discharge Reassessment   Assessment Type Discharge Planning Reassessment     Notified that Pt's SCD's were not delivered and [facility] does not provide.  Referral sent to [facility] after confirming they carry.  Will await response.

## 2023-12-20 NOTE — PLAN OF CARE
12/18/23.  11:30  Pt called stating she never received her SCD hose at home.  Upon research, it appears that [facility] does not have SCD's as deliverable HME.  Unfortunately, the hospital doctor that ordered the SCD's is not on duty and will not return until after Cynthia to clarify if pt still needs SCD hose at home.    Call was placed to Dr. Trevizo's nurse (hematologist pt will follow up with and who saw her in the hospital), to see if he could clarify the situation.      1430:  received follow up call from Dr. Trevizo's office.  Doctor states pt does not need the SCD's and JUSTEN hose are sufficient.   Called pt at home and informed.  She verbalizes understanding.   12/19/23 2210   Discharge Reassessment   Assessment Type Discharge Planning Reassessment

## 2023-12-28 ENCOUNTER — HOSPITAL ENCOUNTER (OUTPATIENT)
Dept: RADIOLOGY | Facility: HOSPITAL | Age: 58
Discharge: HOME | End: 2023-12-28
Attending: NEUROLOGICAL SURGERY
Payer: MEDICARE

## 2023-12-28 ENCOUNTER — OFFICE VISIT (OUTPATIENT)
Dept: NEUROSURGERY | Facility: CLINIC | Age: 58
End: 2023-12-28
Payer: MEDICARE

## 2023-12-28 VITALS
DIASTOLIC BLOOD PRESSURE: 85 MMHG | HEIGHT: 66 IN | SYSTOLIC BLOOD PRESSURE: 129 MMHG | WEIGHT: 122 LBS | HEART RATE: 84 BPM | BODY MASS INDEX: 19.61 KG/M2

## 2023-12-28 DIAGNOSIS — I62.00 NONTRAUMATIC SUBDURAL HEMORRHAGE, UNSPECIFIED: Primary | ICD-10-CM

## 2023-12-28 DIAGNOSIS — I62.00 NONTRAUMATIC SUBDURAL HEMORRHAGE, UNSPECIFIED: ICD-10-CM

## 2023-12-28 DIAGNOSIS — H83.01 VESTIBULITIS OF EAR, RIGHT: ICD-10-CM

## 2023-12-28 DIAGNOSIS — S06.5XAA SUBDURAL HEMATOMA: ICD-10-CM

## 2023-12-28 PROCEDURE — 99214 PR OFFICE/OUTPT VISIT, EST, LEVL IV, 30-39 MIN: ICD-10-PCS | Mod: S$PBB,,, | Performed by: NEUROLOGICAL SURGERY

## 2023-12-28 PROCEDURE — 99214 OFFICE O/P EST MOD 30 MIN: CPT | Mod: S$PBB,,, | Performed by: NEUROLOGICAL SURGERY

## 2023-12-28 PROCEDURE — 70450 CT HEAD WITHOUT CONTRAST: ICD-10-PCS | Mod: 26,,, | Performed by: RADIOLOGY

## 2023-12-28 PROCEDURE — 70450 CT HEAD/BRAIN W/O DYE: CPT | Mod: TC

## 2023-12-28 PROCEDURE — 70450 CT HEAD/BRAIN W/O DYE: CPT | Mod: 26,,, | Performed by: RADIOLOGY

## 2023-12-28 RX ORDER — ENOXAPARIN SODIUM 100 MG/ML
40 INJECTION SUBCUTANEOUS DAILY
Qty: 14 EACH | Refills: 0 | Status: SHIPPED | OUTPATIENT
Start: 2023-12-28 | End: 2024-01-11

## 2023-12-28 RX ORDER — DEXAMETHASONE 4 MG/1
4 TABLET ORAL SEE ADMIN INSTRUCTIONS
Qty: 12 TABLET | Refills: 0 | Status: SHIPPED | OUTPATIENT
Start: 2023-12-28 | End: 2024-01-03

## 2023-12-28 NOTE — PROGRESS NOTES
History of Present Illness: Ms. Marianela Potter 58-year-old female with past medical history ulcerative colitis, TIA, hypertension, mitral valve prolapse, GERD, bilateral hearing loss, tinnitus, prothrombin gene mutation, homozygous MTHFR mutation, PE x2 treated with Coumadin.  Patient presented to the emergency department on 12/11/2023 after being prompted by ENT due to an abnormal MRI which revealed bilateral subdural hematoma.  Patient reports in October of this year she began having vertigo, increased pressure in head, vibratory sensation in maxillary sinuses when speaking, hypersensitivity to noise and short-term memory loss.  Today, patient reports frontal head pressure which has slightly improved since admission.  In Additionally patient reports she is on lisinopril for hypertension but does not take medication every day because it sometimes cause hypotension.  She states she does monitor her blood pressure at home and when it begins rising she takes her lisinopril.  Denies extremity weakness, numbness or tingling.    Blood pressure markedly elevated on admission     Coumadin reversed, INR 1.0     Of note, patient is followed by Dr. Charlton, hematologist.     Neurosurgery consulted.    Interval history 12/28/2023: She follows up as an outpatient today with repeat head CT.  She reports ongoing right tinnitus.  She was seen by ENT in [place] who ordered the above MRI.  She reports that audiogram demonstrated hearing loss.  Of note, she underwent a head CT for tinnitus on November 1, 2023 which showed no hemorrhage at that time.  She remains off Coumadin.  She is wearing Norman hose stockings.  She denies headache, speech difficulty, visual disturbance, extremity weakness.  Blood pressure is much improved, 129/85.  At the time of admission her systolic was above 200.    Repeat head CT obtained today reviewed with the patient.  Persistent, but smaller small right-greater-than-left extra-axial fluid  collections.  No midline shift.    Exam:  No distress.  Awake, alert, oriented to person place and time.    Pupils equal, reactive.  EOMI.  No nystagmus.  Face symmetric.    Strength is graded 5/5 in all muscle groups.    Sensation is grossly intact throughout.    Gait and stance are normal    Analysis: We will begin bridging with Lovenox 40 mg subQ daily; goal resume Coumadin as soon as medically reasonable.  Continue Norman hose.  Repeat head CT in 2 weeks.  I gave her a dexamethasone taper for possible vestibulitis.  She should follow up with Dr. Charlton, hematology and ENT in [place].      Marianela was seen today for follow-up.    Diagnoses and all orders for this visit:    Nontraumatic subdural hemorrhage, unspecified    Subdural hematoma  -     CT Head Without Contrast; Future    Vestibulitis of ear, right    Other orders  The following orders have not been finalized:  -     enoxaparin (LOVENOX) 40 mg/0.4 mL Syrg  -     dexAMETHasone (DECADRON) 4 MG Tab      0  I spent a total of 30 minutes on the day of the visit.  This includes face to face time and non-face to face time preparing to see the patient (eg, review of tests), obtaining and/or reviewing separately obtained history, documenting clinical information in the electronic or other health record, independently interpreting results and communicating results to the patient/family/caregiver, or care coordinator.

## 2023-12-29 ENCOUNTER — TELEPHONE (OUTPATIENT)
Dept: HEMATOLOGY/ONCOLOGY | Facility: CLINIC | Age: 58
End: 2023-12-29

## 2023-12-29 NOTE — TELEPHONE ENCOUNTER
Spoke with patient and confirmed that she is taking 40mg Lovenox sq daily x 2 weeks per Dr. Townsend. Pt saw Dr Townsend 12/28/23 and will follow up with him 1/11/24. She will then have another CT Scan and from there Dr Townsend will decide if she continues Lovenox or resumes her Coumadin. Pt to follow up with Dr. Trevizo 1/12/24. Advised pt to call back for any problems, questions, or concerns. Pt verbalized understanding.

## 2024-01-11 ENCOUNTER — HOSPITAL ENCOUNTER (OUTPATIENT)
Dept: RADIOLOGY | Facility: HOSPITAL | Age: 59
Discharge: HOME | End: 2024-01-11
Attending: NEUROLOGICAL SURGERY
Payer: MEDICARE

## 2024-01-11 ENCOUNTER — TELEPHONE (OUTPATIENT)
Dept: HEMATOLOGY/ONCOLOGY | Facility: CLINIC | Age: 59
End: 2024-01-11

## 2024-01-11 ENCOUNTER — OFFICE VISIT (OUTPATIENT)
Dept: NEUROSURGERY | Facility: CLINIC | Age: 59
End: 2024-01-11
Payer: MEDICARE

## 2024-01-11 VITALS
BODY MASS INDEX: 19.61 KG/M2 | SYSTOLIC BLOOD PRESSURE: 157 MMHG | HEIGHT: 66 IN | DIASTOLIC BLOOD PRESSURE: 89 MMHG | WEIGHT: 122 LBS | HEART RATE: 92 BPM

## 2024-01-11 DIAGNOSIS — S06.5XAA SUBDURAL HEMATOMA: Primary | ICD-10-CM

## 2024-01-11 DIAGNOSIS — S06.5XAA SUBDURAL HEMATOMA: ICD-10-CM

## 2024-01-11 PROCEDURE — 99213 OFFICE O/P EST LOW 20 MIN: CPT | Mod: S$PBB,,, | Performed by: NEUROLOGICAL SURGERY

## 2024-01-11 PROCEDURE — 70450 CT HEAD/BRAIN W/O DYE: CPT | Mod: 26,,, | Performed by: RADIOLOGY

## 2024-01-11 PROCEDURE — 70450 CT HEAD/BRAIN W/O DYE: CPT | Mod: TC

## 2024-01-11 RX ORDER — WARFARIN 4 MG/1
4 TABLET ORAL NIGHTLY
COMMUNITY

## 2024-01-11 RX ORDER — ENOXAPARIN SODIUM 100 MG/ML
40 INJECTION SUBCUTANEOUS DAILY
Qty: 14 EACH | Refills: 0 | Status: SHIPPED | OUTPATIENT
Start: 2024-01-11

## 2024-01-11 NOTE — TELEPHONE ENCOUNTER
Spoke with patient and she stated that she did see Dr. Townsend today and that she did have her CT scan. I informed her that Dr. Trevizo did not get back with me about her needing a Vena Cava US and that if he wants to order one he will at their appt tomorrow. Pt verbalized understanding.

## 2024-01-12 ENCOUNTER — OFFICE VISIT (OUTPATIENT)
Dept: HEMATOLOGY/ONCOLOGY | Facility: CLINIC | Age: 59
End: 2024-01-12
Payer: MEDICARE

## 2024-01-12 VITALS
RESPIRATION RATE: 16 BRPM | BODY MASS INDEX: 18.5 KG/M2 | SYSTOLIC BLOOD PRESSURE: 122 MMHG | HEART RATE: 97 BPM | DIASTOLIC BLOOD PRESSURE: 74 MMHG | WEIGHT: 115.13 LBS | HEIGHT: 66 IN | TEMPERATURE: 98 F

## 2024-01-12 DIAGNOSIS — Z95.828 S/P INSERTION OF IVC (INFERIOR VENA CAVAL) FILTER: Primary | ICD-10-CM

## 2024-01-12 PROCEDURE — 99214 OFFICE O/P EST MOD 30 MIN: CPT | Mod: S$GLB,,, | Performed by: INTERNAL MEDICINE

## 2024-01-12 NOTE — LETTER
January 14, 2024        Sage Gallardo MD  [address] [place] [address]             [facility] - Hematology Oncology  [address] [address]  Phone: [phone]  Fax: [phone]   Patient: Marianela Potter   MR Number: [id number]   YOB: 1965   Date of Visit: 1/12/2024       Dear Dr. Gallardo:    Thank you for referring Marianela Potter to me for evaluation. Below are the relevant portions of my assessment and plan of care.            If you have questions, please do not hesitate to call me. I look forward to following Marianela along with you.    Sincerely,      LEONEL Trevizo MD           CC    No Recipients
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
## 2024-01-29 ENCOUNTER — HOSPITAL ENCOUNTER (OUTPATIENT)
Dept: RADIOLOGY | Facility: HOSPITAL | Age: 59
Discharge: HOME | End: 2024-01-29
Attending: INTERNAL MEDICINE
Payer: MEDICARE

## 2024-01-29 ENCOUNTER — HOSPITAL ENCOUNTER (OUTPATIENT)
Dept: RADIOLOGY | Facility: HOSPITAL | Age: 59
Discharge: HOME | End: 2024-01-29
Attending: NEUROLOGICAL SURGERY
Payer: MEDICARE

## 2024-01-29 ENCOUNTER — OFFICE VISIT (OUTPATIENT)
Dept: NEUROSURGERY | Facility: CLINIC | Age: 59
End: 2024-01-29
Payer: MEDICARE

## 2024-01-29 VITALS
SYSTOLIC BLOOD PRESSURE: 164 MMHG | WEIGHT: 113 LBS | HEART RATE: 76 BPM | DIASTOLIC BLOOD PRESSURE: 92 MMHG | BODY MASS INDEX: 18.16 KG/M2 | HEIGHT: 66 IN

## 2024-01-29 DIAGNOSIS — S06.5XAA SUBDURAL HEMATOMA: ICD-10-CM

## 2024-01-29 DIAGNOSIS — Z95.828 S/P INSERTION OF IVC (INFERIOR VENA CAVAL) FILTER: ICD-10-CM

## 2024-01-29 DIAGNOSIS — I62.00 NONTRAUMATIC SUBDURAL HEMORRHAGE, UNSPECIFIED: Primary | ICD-10-CM

## 2024-01-29 PROCEDURE — 76705 ECHO EXAM OF ABDOMEN: CPT | Mod: 26,,, | Performed by: RADIOLOGY

## 2024-01-29 PROCEDURE — 70450 CT HEAD/BRAIN W/O DYE: CPT | Mod: 26,,, | Performed by: RADIOLOGY

## 2024-01-29 PROCEDURE — 99213 OFFICE O/P EST LOW 20 MIN: CPT | Mod: S$PBB,,, | Performed by: NEUROLOGICAL SURGERY

## 2024-01-29 PROCEDURE — 76705 ECHO EXAM OF ABDOMEN: CPT | Mod: TC

## 2024-01-29 PROCEDURE — 70450 CT HEAD/BRAIN W/O DYE: CPT | Mod: TC

## 2024-01-29 NOTE — PROGRESS NOTES
History of Present Illness: Ms. Marianela Potter 58-year-old female with past medical history ulcerative colitis, TIA, hypertension, mitral valve prolapse, GERD, bilateral hearing loss, tinnitus, prothrombin gene mutation, homozygous MTHFR mutation, PE x2 treated with Coumadin.  Patient presented to the emergency department on 12/11/2023 after being prompted by ENT due to an abnormal MRI which revealed bilateral subdural hematoma.  Patient reports in October of this year she began having vertigo, increased pressure in head, vibratory sensation in maxillary sinuses when speaking, hypersensitivity to noise and short-term memory loss.  Today, patient reports frontal head pressure which has slightly improved since admission.  In Additionally patient reports she is on lisinopril for hypertension but does not take medication every day because it sometimes cause hypotension.  She states she does monitor her blood pressure at home and when it begins rising she takes her lisinopril.  Denies extremity weakness, numbness or tingling.     Blood pressure markedly elevated on admission     Coumadin reversed, INR 1.0     Of note, patient is followed by Dr. Charlton, hematologist.           Interval history 12/28/2023: She follows up as an outpatient today with repeat head CT.  She reports ongoing right tinnitus.  She was seen by ENT in [place] who ordered the above MRI.  She reports that audiogram demonstrated hearing loss.  Of note, she underwent a head CT for tinnitus on November 1, 2023 which showed no hemorrhage at that time.  She remains off Coumadin.  She is wearing Norman hose stockings.  She denies headache, speech difficulty, visual disturbance, extremity weakness.  Blood pressure is much improved, 129/85.  At the time of admission her systolic was above 200.     Repeat head CT obtained today reviewed with the patient.  Persistent, but smaller small right-greater-than-left extra-axial fluid collections.  No midline  shift.     Interval history 01/11/2024:  She returns with repeat head CT for review.  She has been using Lovenox 40 mg subQ daily.  She reports that 2 days ago she had a small nosebleed did not take additional Lovenox.  She is scheduled to see her hematologist tomorrow to discuss need for continued anticoagulation in light of ICH..  She reports intermittent pulsing sensation in the right frontal temporal region.  She denies other neurologic complaints.     Head CT reviewed with the patient and compared to study on 12/28/2023.  The bilateral convexity collections are slowly improving with small residual chronic blood right-greater-than-left.  No mass effect or shift.    Interval history January 20, 2024: She returns with repeat head CT for review.  She denies neurologic symptoms.  She reports flushing spots on her skin with Lovenox.  She declines to continue Lovenox.    Repeat head CT reviewed in detail.  On my review there is hyperdense extra-axial fluid which is more prominent versus her last scan on January 11, 2023.  The size of the extra-axial fluid collections are decreased however.    Exam:  No distress.  Awake, alert, oriented to person place and time.    Pupils equal, reactive.  EOMI.  No nystagmus.  Face symmetric.    Strength is graded 5/5 in all muscle groups.    Sensation is grossly intact throughout.    Gait and stance are normal    Analysis:  Given the head CT findings, I am not comfortable with resuming full anticoagulation now - though the hyperdensity tracts along the previous subdural on the right, new hemorrhage can not be excluded.  I recommend continuing Lovenox and repeat head CT in 2 weeks.  She is strongly opposed to continued bridging with Lovenox.  She is well aware of the risk for a thromboembolic event.  She states I am more concerned about a new brain hemorrhage than a new blood clot.  We will see her back with a head CT in 2 weeks.      Marianela was seen today for  follow-up.    Diagnoses and all orders for this visit:    Nontraumatic subdural hemorrhage, unspecified  -     CT Head Without Contrast; Future    Subdural hematoma      I spent a total of 20 minutes on the day of the visit.  This includes face to face time and non-face to face time preparing to see the patient (eg, review of tests), obtaining and/or reviewing separately obtained history, documenting clinical information in the electronic or other health record, independently interpreting results and communicating results to the patient/family/caregiver, or care coordinator.

## 2024-02-12 ENCOUNTER — OFFICE VISIT (OUTPATIENT)
Dept: NEUROSURGERY | Facility: CLINIC | Age: 59
End: 2024-02-12
Payer: MEDICARE

## 2024-02-12 ENCOUNTER — HOSPITAL ENCOUNTER (OUTPATIENT)
Dept: RADIOLOGY | Facility: HOSPITAL | Age: 59
Discharge: HOME | End: 2024-02-12
Attending: NEUROLOGICAL SURGERY
Payer: MEDICARE

## 2024-02-12 VITALS
HEIGHT: 66 IN | WEIGHT: 113 LBS | DIASTOLIC BLOOD PRESSURE: 86 MMHG | SYSTOLIC BLOOD PRESSURE: 124 MMHG | BODY MASS INDEX: 18.16 KG/M2 | HEART RATE: 81 BPM

## 2024-02-12 DIAGNOSIS — I62.00 NONTRAUMATIC SUBDURAL HEMORRHAGE, UNSPECIFIED: ICD-10-CM

## 2024-02-12 DIAGNOSIS — S06.5XAA SUBDURAL HEMATOMA: Primary | ICD-10-CM

## 2024-02-12 PROCEDURE — 70450 CT HEAD/BRAIN W/O DYE: CPT | Mod: 26,,, | Performed by: RADIOLOGY

## 2024-02-12 PROCEDURE — 70450 CT HEAD/BRAIN W/O DYE: CPT | Mod: TC

## 2024-02-12 PROCEDURE — 99213 OFFICE O/P EST LOW 20 MIN: CPT | Mod: S$PBB,,, | Performed by: NEUROLOGICAL SURGERY

## 2024-02-13 NOTE — PROGRESS NOTES
Marianela returns with surveillance head CT imaging regarding bilateral convexity subdural hematomas.  She reports her headaches have essentially resolved.  She notes a transient right temporal headache which comes on a roughly once per week.  She notes that this headache pattern was present prior to developing the subdural hematomas.  She denies visual disturbance, speech difficulty, extremity weakness.  She was recently evaluated by Dr. Charlton who was considering Eliquis in lieu of Coumadin.    Head CT obtained today reviewed.  Near resolution of the bilateral subdural hematomas.  No new hemorrhage.  Dural thickening noted in right frontal region.      Exam:  No distress  Awake, alert, oriented to person place and time.    Cranial nerves 2-12 grossly intact.    Strength is graded 5/5 in all muscle groups.    Sensation is grossly intact throughout.    Gait and stance are normal    Analysis: Given the above findings, she may resume anticoagulation.   She voiced understanding, though remains reluctant to resume anticoagulation.  She plans to discuss further with Dr. Charlton.  No indication for repeat imaging at this point.  I offered to refer her to Neurology for headache assessment/management; she wants to consider this option.  We will be glad to see her back as needed.    Marianela was seen today for follow-up.    Diagnoses and all orders for this visit:    Subdural hematoma      I spent a total of 20 minutes on the day of the visit.  This includes face to face time and non-face to face time preparing to see the patient (eg, review of tests), obtaining and/or reviewing separately obtained history, documenting clinical information in the electronic or other health record, independently interpreting results and communicating results to the patient/family/caregiver, or care coordinator.

## 2024-03-14 ENCOUNTER — TELEPHONE (OUTPATIENT)
Dept: HEMATOLOGY/ONCOLOGY | Facility: CLINIC | Age: 59
End: 2024-03-14

## 2024-03-14 DIAGNOSIS — I62.00 NONTRAUMATIC SUBDURAL HEMATOMA: Primary | ICD-10-CM

## 2024-03-25 ENCOUNTER — HOSPITAL ENCOUNTER (OUTPATIENT)
Dept: RADIOLOGY | Facility: HOSPITAL | Age: 59
Discharge: HOME | End: 2024-03-25
Attending: INTERNAL MEDICINE
Payer: MEDICARE

## 2024-03-25 DIAGNOSIS — I62.00 NONTRAUMATIC SUBDURAL HEMATOMA: ICD-10-CM

## 2024-03-25 PROCEDURE — 70450 CT HEAD/BRAIN W/O DYE: CPT | Mod: 26,,, | Performed by: RADIOLOGY

## 2024-03-25 PROCEDURE — 70450 CT HEAD/BRAIN W/O DYE: CPT | Mod: TC

## 2024-04-03 ENCOUNTER — OFFICE VISIT (OUTPATIENT)
Dept: HEMATOLOGY/ONCOLOGY | Facility: CLINIC | Age: 59
End: 2024-04-03
Payer: MEDICARE

## 2024-04-03 VITALS
SYSTOLIC BLOOD PRESSURE: 145 MMHG | DIASTOLIC BLOOD PRESSURE: 81 MMHG | BODY MASS INDEX: 18.16 KG/M2 | WEIGHT: 113 LBS | TEMPERATURE: 98 F | HEIGHT: 66 IN

## 2024-04-03 DIAGNOSIS — D68.52 PROTHROMBIN GENE MUTATION: ICD-10-CM

## 2024-04-03 DIAGNOSIS — Z86.711 HISTORY OF PULMONARY EMBOLUS (PE): ICD-10-CM

## 2024-04-03 DIAGNOSIS — Z15.89 HOMOZYGOUS MTHFR MUTATION C677T: ICD-10-CM

## 2024-04-03 DIAGNOSIS — Z86.73 HISTORY OF TRANSIENT ISCHEMIC ATTACK (TIA): ICD-10-CM

## 2024-04-03 DIAGNOSIS — I62.00 NON-TRAUMATIC SUBDURAL HEMATOMA (SDH): Primary | ICD-10-CM

## 2024-04-03 PROCEDURE — 99214 OFFICE O/P EST MOD 30 MIN: CPT | Mod: S$GLB,,, | Performed by: INTERNAL MEDICINE

## 2024-04-03 NOTE — LETTER
April 3, 2024        Sage Gallardo MD  [address] [place] [address]             [facility] - Hematology Oncology  [address] [facility] [address]  Phone: [phone]  Fax: [phone]   Patient: Marianela Potter   MR Number: [id number]   YOB: 1965   Date of Visit: 4/3/2024       Dear Dr. Gallardo:    Thank you for referring Marianela Potter to me for evaluation. Below are the relevant portions of my assessment and plan of care.            If you have questions, please do not hesitate to call me. I look forward to following Marianela along with you.    Sincerely,      LEONEL Trevizo MD           CC    No Recipients
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
## 2024-07-08 ENCOUNTER — OFFICE VISIT (OUTPATIENT)
Facility: CLINIC | Age: 59
End: 2024-07-08
Payer: MEDICARE

## 2024-07-08 VITALS
DIASTOLIC BLOOD PRESSURE: 78 MMHG | BODY MASS INDEX: 18.19 KG/M2 | SYSTOLIC BLOOD PRESSURE: 125 MMHG | HEIGHT: 66 IN | TEMPERATURE: 98 F | HEART RATE: 78 BPM | RESPIRATION RATE: 18 BRPM | WEIGHT: 113.19 LBS

## 2024-07-08 DIAGNOSIS — I62.00 NON-TRAUMATIC SUBDURAL HEMATOMA (SDH): ICD-10-CM

## 2024-07-08 DIAGNOSIS — Z86.73 HISTORY OF TRANSIENT ISCHEMIC ATTACK (TIA): ICD-10-CM

## 2024-07-08 DIAGNOSIS — Z86.711 HISTORY OF PULMONARY EMBOLUS (PE): Primary | ICD-10-CM

## 2024-07-08 PROCEDURE — 99215 OFFICE O/P EST HI 40 MIN: CPT | Mod: S$PBB,,, | Performed by: INTERNAL MEDICINE

## 2024-07-08 PROCEDURE — G2211 COMPLEX E/M VISIT ADD ON: HCPCS | Mod: S$PBB,,, | Performed by: INTERNAL MEDICINE

## 2024-07-08 PROCEDURE — 99999 PR PBB SHADOW E&M-EST. PATIENT-LVL III: CPT | Mod: PBBFAC,,, | Performed by: INTERNAL MEDICINE

## 2024-07-08 PROCEDURE — 99213 OFFICE O/P EST LOW 20 MIN: CPT | Mod: PBBFAC,PN | Performed by: INTERNAL MEDICINE
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
## 2024-07-08 NOTE — LETTER
July 8, 2024        Sage Gallardo MD  [address] [place] [address]             [facility] - Hematology Oncology  [address]  [place] [address]  Phone: [phone]  Fax: [phone]   Patient: Marianela Potter   MR Number: [id number]   YOB: 1965   Date of Visit: 7/8/2024       Dear Dr. Gallardo:    Thank you for referring Marianela Potter to me for evaluation. Below are the relevant portions of my assessment and plan of care.            If you have questions, please do not hesitate to call me. I look forward to following Marianela along with you.    Sincerely,      LEONEL Trevizo MD           CC    No Recipients

## 2024-09-29 NOTE — PT/OT/SLP PROGRESS
Occupational Therapy      Patient Name:  Marianela Potter   MRN:  [id number]    OT order discontinued; pt is currently at her baseline.      12/13/2023  
Physical Therapy      Patient Name:  Marianela Potter   MRN:  [id number]    Patient not seen today secondary to patient declines need for PT evaluation. She believes therapy orders were placed due to fatigue yesterday attributed to hypotension. She was seen ambulating in room independently. She is currently at her baseline. PT orders discontinued.     
stated

## 2024-10-08 ENCOUNTER — OFFICE VISIT (OUTPATIENT)
Dept: PULMONOLOGY | Facility: CLINIC | Age: 59
End: 2024-10-08
Payer: MEDICARE

## 2024-10-08 VITALS
DIASTOLIC BLOOD PRESSURE: 91 MMHG | OXYGEN SATURATION: 98 % | BODY MASS INDEX: 18.05 KG/M2 | HEIGHT: 66 IN | HEART RATE: 88 BPM | SYSTOLIC BLOOD PRESSURE: 145 MMHG | WEIGHT: 112.31 LBS

## 2024-10-08 DIAGNOSIS — J43.8 OTHER EMPHYSEMA: ICD-10-CM

## 2024-10-08 DIAGNOSIS — J92.0 CALCIFIED PLEURAL PLAQUE DUE TO ASBESTOS EXPOSURE: Primary | ICD-10-CM

## 2024-10-08 PROCEDURE — 99213 OFFICE O/P EST LOW 20 MIN: CPT | Mod: PBBFAC,PO | Performed by: INTERNAL MEDICINE

## 2024-10-08 PROCEDURE — 99204 OFFICE O/P NEW MOD 45 MIN: CPT | Mod: S$PBB,,, | Performed by: INTERNAL MEDICINE

## 2024-10-08 PROCEDURE — 99999 PR PBB SHADOW E&M-EST. PATIENT-LVL III: CPT | Mod: PBBFAC,,, | Performed by: INTERNAL MEDICINE

## 2024-10-08 RX ORDER — AMLODIPINE BESYLATE 5 MG/1
5 TABLET ORAL
COMMUNITY
Start: 2024-10-04 | End: 2025-10-04

## 2024-10-08 RX ORDER — ALBUTEROL SULFATE 90 UG/1
1-2 INHALANT RESPIRATORY (INHALATION) EVERY 4 HOURS PRN
Qty: 18 G | Refills: 11 | Status: SHIPPED | OUTPATIENT
Start: 2024-10-08 | End: 2025-10-08

## 2024-10-08 NOTE — PROGRESS NOTES
"10/8/2024    Marianela Potter  New Patient Consult    Chief Complaint   Patient presents with    COPD       HPI:10/08/2024- Pt is a 60 yo female with hx of PE, TIA, SDH, HTN, gastroparesis ulcerative colitis, ileostomy s/p complete proctocolectomy presenting for new evaluation.  Pt was referred by her GI doctor due to imaging abnormalities "emphysematous changes, scarring"- images not available for me to view.  She feels sob, varies depending on the day. She notices it more frequently over the past year but problem has been progressive for about 2 yrs. She notices chest pressure and SOB some days while walking dogs. She also gets breathless at times while talking. Pleuritic pains R back with deep breaths. Never tried inhalers.  +dry cough occasionally. Denies recent respiratory infections. She did have COVID 3 times.  She worked as a  in past, worked in [employer] right after [employer] went up. Possible asbestos exposure.  She had bronchoscopy in 2018- she states negative for infection, done at [facility]. She saw Dr. Khan in past but he is now retired.  She had pneumonia in past- severe, treated outpatient with abx.  She has complicated GI history- UC dx 36 yrs ago, eventually had to have proctocolectomy w ileostomy, then uterus fell over and had to have hysterectomy, then SMA syndrome requiring duodenal bypass.  She never smoked but was exposed to a lot of secondhand smoke from her parents.  FH sister has COPD.     The chief complaint problem is New to me    PFSH:  Past Medical History:   Diagnosis Date    Clotting disorder     Gastric paresis     Hypercoagulable state          Past Surgical History:   Procedure Laterality Date    CHOLECYSTECTOMY      COLON SURGERY      HYSTERECTOMY      ILEOSTOMY      STOMACH SURGERY      superior Mesenteric Artery Syndrome      TOTAL ABDOMINAL HYSTERECTOMY       Social History     Tobacco Use    Smoking status: Never   Substance Use Topics    Alcohol use: " "No    Drug use: No     Family History   Problem Relation Name Age of Onset    Colon cancer Neg Hx       Review of patient's allergies indicates:   Allergen Reactions    Ketorolac     Morphine     Nsaids (non-steroidal anti-inflammatory drug) Other (See Comments)       Performance Status:The patient's activity level is functions out of house.      Review of Systems:  a review of eleven systems covering constitutional, Eye, HEENT, Psych, Respiratory, Cardiac, GI, , Musculoskeletal, Endocrine, Dermatologic was negative except for pertinent findings as listed ABOVE and below:  Progressive wt loss     Exam:Comprehensive exam done. BP (!) 145/91 (BP Location: Right arm, Patient Position: Sitting)   Pulse 88   Ht 5' 6" (1.676 m)   Wt 51 kg (112 lb 5.2 oz)   SpO2 98%   BMI 18.13 kg/m²   Exam included Vitals as listed, and patient's appearance and affect and alertness and mood, oral exam for yeast and hygiene and pharynx lesions and Mallapatti (M) score, neck with inspection for jvd and masses and thyroid abnormalities and lymph nodes (supraclavicular and infraclavicular nodes and axillary also examined and noted if abn), chest exam included symmetry and effort and fremitus and percussion and auscultation, cardiac exam included rhythm and gallops and murmur and rubs and jvd and edema, abdominal exam for mass and hepatosplenomegaly and tenderness and hernias and bowel sounds, Musculoskeletal exam with muscle tone and posture and mobility/gait and  strength, and skin for rashes and cyanosis and pallor and turgor, extremity for clubbing.  Findings were normal except for pertinent findings listed below:  M1, oropharynx clear  HR regular  Breath sounds clear  Cachectic  No edema/clubbing    Radiographs (ct chest and cxr) reviewed: results reviewed  Outside images report only  CTA chest 10/4/24-   Findings: Normal soft tissues and osseous structures of the chest wall. Normal thoracic inlet and central airways. Heart and " great vessels are unremarkable. No filling defects within the pulmonary arteries. There are partially calcified pleural plaques within the upper lobes. There is hyperinflation of the lungs. No mediastinal, hilar or axillary adenopathy.     1. No evidence of pulmonary embolism.   2. Partially calcified pleural plaques which may be secondary to previous spaces exposure.   3. Hyperinflation suggestive of mild COPD.     CT chest 2020- report only  The central tracheobronchial tree is patent. There is redemonstration   of biapical pleural-parenchymal thickening and scarring, greater in   the left lung. Left lung base scarring is unchanged. No pulmonary   nodules, consolidation or pleural effusion identified.     Labs reviewed     Bronch 2018- AFB, routine and fungal cultures all neg  Path:   Transbronchial Biopsy Left Upper Lobe:Benign bronchial and alveolar lung tissue, no atypia present in sections from multiple levels     Cytology:  NO MALIGNANT CELLS IDENTIFIED. BENIGN BRONCHIAL CELLS WITH MIXED INFLAMMATION.   GMS STAIN NEGATIVE FOR FUNGAL ORGANISMS.  CONTROL ACCEPTABLE.     2018-   Cryptococcus neg  Aspergillus fumigatus Neg:<1:1 Negative    Aspergillus Flavus Antibodies Neg:<1:1 Negative    Aspergillus Niger Antibodies Neg:<1:1 Negative    Blastomyces Abs, Qn, DID Neg:<1:1 Negative    Histoplasma Abs, Qn, DID Neg:<1:1 Negative    Histo Ag urine negative  Quantiferon negative    Lab Results   Component Value Date    WBC 6.92 12/13/2023    HGB 12.2 12/13/2023    HCT 37.2 12/13/2023    MCV 94 12/13/2023     12/13/2023       CMP  Sodium   Date Value Ref Range Status   12/13/2023 139 136 - 145 mmol/L Final     Potassium   Date Value Ref Range Status   12/13/2023 3.6 3.5 - 5.1 mmol/L Final     Chloride   Date Value Ref Range Status   12/13/2023 109 95 - 110 mmol/L Final     CO2   Date Value Ref Range Status   12/13/2023 27 23 - 29 mmol/L Final     Glucose   Date Value Ref Range Status   12/13/2023 92 70 - 110  mg/dL Final     BUN   Date Value Ref Range Status   12/13/2023 7 6 - 20 mg/dL Final     Creatinine   Date Value Ref Range Status   12/13/2023 0.6 0.5 - 1.4 mg/dL Final     Calcium   Date Value Ref Range Status   12/13/2023 9.2 8.7 - 10.5 mg/dL Final     Total Protein   Date Value Ref Range Status   12/13/2023 6.4 6.0 - 8.4 g/dL Final     Albumin   Date Value Ref Range Status   12/13/2023 3.7 3.5 - 5.2 g/dL Final     Total Bilirubin   Date Value Ref Range Status   12/13/2023 0.9 0.1 - 1.0 mg/dL Final     Comment:     For infants and newborns, interpretation of results should be based  on gestational age, weight and in agreement with clinical  observations.    Premature Infant recommended reference ranges:  Up to 24 hours.............<8.0 mg/dL  Up to 48 hours............<12.0 mg/dL  3-5 days..................<15.0 mg/dL  6-29 days.................<15.0 mg/dL       Alkaline Phosphatase   Date Value Ref Range Status   12/13/2023 62 55 - 135 U/L Final     AST   Date Value Ref Range Status   12/13/2023 14 10 - 40 U/L Final     ALT   Date Value Ref Range Status   12/13/2023 13 10 - 44 U/L Final     Anion Gap   Date Value Ref Range Status   12/13/2023 3 (L) 8 - 16 mmol/L Final     eGFR   Date Value Ref Range Status   12/13/2023 >60.0 >60 mL/min/1.73 m^2 Final         PFT will be done and results to be reviewed      Plan:  Clinical impression is ambiguous and will need repeated evaluation wrt   Imaging abnormalities - images not available to me today  SOB- obtain PFT    Problem List Items Addressed This Visit    None  Visit Diagnoses       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    Discussed with patient above for education the following:      Patient Instructions   Pulmonary  function testing  Please get images of lungs on a disc for me to review- including past CT chest dated 2020  Try albuterol inhaler as needed for shortness of breath or coughing

## 2024-10-08 NOTE — PATIENT INSTRUCTIONS
Pulmonary function testing  Please get images of lungs on a disc for me to review- including past CT chest dated 2020  Try albuterol inhaler as needed for shortness of breath or coughing  
Bladder non-tender and non-distended. Urine clear yellow.

## 2024-10-21 ENCOUNTER — HOSPITAL ENCOUNTER (OUTPATIENT)
Dept: PULMONOLOGY | Facility: HOSPITAL | Age: 59
Discharge: HOME | End: 2024-10-21
Attending: INTERNAL MEDICINE
Payer: MEDICARE

## 2024-10-21 DIAGNOSIS — J43.8 OTHER EMPHYSEMA: ICD-10-CM

## 2024-10-21 LAB
DLCO SINGLE BREATH LLN: 18.4
DLCO SINGLE BREATH PRE REF: 83.7 %
DLCO SINGLE BREATH REF: 24.13
DLCOC SBVA LLN: 3.19
DLCOC SBVA REF: 4.58
DLCOC SINGLE BREATH LLN: 18.4
DLCOC SINGLE BREATH REF: 24.13
DLCOVA LLN: 3.19
DLCOVA PRE REF: 78.5 %
DLCOVA PRE: 3.59 ML/(MIN*MMHG*L) (ref 3.19–5.96)
DLCOVA REF: 4.58
ERV LLN: -16449.16
ERV PRE REF: 101.7 %
ERV REF: 0.84
FEF 25 75 CHG: 45.3 %
FEF 25 75 LLN: 1.61
FEF 25 75 POST REF: 99.5 %
FEF 25 75 PRE REF: 68.5 %
FEF 25 75 REF: 3.01
FET100 CHG: -11.9 %
FEV1 CHG: 9.7 %
FEV1 FVC CHG: 9 %
FEV1 FVC LLN: 67
FEV1 FVC POST REF: 102 %
FEV1 FVC PRE REF: 93.5 %
FEV1 FVC REF: 79
FEV1 LLN: 2.04
FEV1 POST REF: 108.4 %
FEV1 PRE REF: 98.8 %
FEV1 REF: 2.69
FRCPLETH LLN: 1.99
FRCPLETH PREREF: 128.2 %
FRCPLETH REF: 2.81
FVC CHG: 0.6 %
FVC LLN: 2.61
FVC POST REF: 105.5 %
FVC PRE REF: 104.8 %
FVC REF: 3.43
IVC PRE: 3.44 L (ref 2.61–4.28)
IVC SINGLE BREATH LLN: 2.61
IVC SINGLE BREATH PRE REF: 100.4 %
IVC SINGLE BREATH REF: 3.43
MVV LLN: 87
MVV PRE REF: 97.5 %
MVV REF: 102
PEF CHG: -3.6 %
PEF LLN: 4.84
PEF POST REF: 78.3 %
PEF PRE REF: 81.2 %
PEF REF: 6.66
POST FEF 25 75: 2.99 L/S (ref 1.61–4.41)
POST FET 100: 3.55 SEC
POST FEV1 FVC: 80.75 % (ref 67.31–89.3)
POST FEV1: 2.92 L (ref 2.04–3.32)
POST FVC: 3.62 L (ref 2.61–4.28)
POST PEF: 5.22 L/S (ref 4.84–8.48)
PRE DLCO: 20.2 ML/(MIN*MMHG) (ref 18.4–29.87)
PRE ERV: 0.85 L (ref -16449.16–16450.84)
PRE FEF 25 75: 2.06 L/S (ref 1.61–4.41)
PRE FET 100: 4.02 SEC
PRE FEV1 FVC: 74.05 % (ref 67.31–89.3)
PRE FEV1: 2.66 L (ref 2.04–3.32)
PRE FRC PL: 3.61 L (ref 1.99–3.64)
PRE FVC: 3.59 L (ref 2.61–4.28)
PRE MVV: 99.37 L/MIN (ref 86.59–117.15)
PRE PEF: 5.41 L/S (ref 4.84–8.48)
PRE RV: 2.76 L (ref 1.4–2.55)
PRE TLC: 6.35 L (ref 4.29–6.26)
RAW LLN: 3.06
RAW PRE REF: 86.5 %
RAW PRE: 2.65 CMH2O*S/L (ref 3.06–3.06)
RAW REF: 3.06
RV LLN: 1.4
RV PRE REF: 139.4 %
RV REF: 1.98
RVTLC LLN: 29
RVTLC PRE REF: 111.3 %
RVTLC PRE: 43.43 % (ref 29.43–48.61)
RVTLC REF: 39
TLC LLN: 4.29
TLC PRE REF: 120.4 %
TLC REF: 5.27
VA PRE: 5.62 L (ref 5.12–5.12)
VA SINGLE BREATH LLN: 5.12
VA SINGLE BREATH PRE REF: 109.7 %
VA SINGLE BREATH REF: 5.12
VC LLN: 2.61
VC PRE REF: 104.8 %
VC PRE: 3.59 L (ref 2.61–4.28)
VC REF: 3.43

## 2024-10-21 PROCEDURE — 94726 PLETHYSMOGRAPHY LUNG VOLUMES: CPT | Mod: 26,,, | Performed by: INTERNAL MEDICINE

## 2024-10-21 PROCEDURE — 94060 EVALUATION OF WHEEZING: CPT

## 2024-10-21 PROCEDURE — 94726 PLETHYSMOGRAPHY LUNG VOLUMES: CPT

## 2024-10-21 PROCEDURE — 94729 DIFFUSING CAPACITY: CPT | Mod: 26,,, | Performed by: INTERNAL MEDICINE

## 2024-10-21 PROCEDURE — 94729 DIFFUSING CAPACITY: CPT

## 2024-10-21 PROCEDURE — 94060 EVALUATION OF WHEEZING: CPT | Mod: 26,,, | Performed by: INTERNAL MEDICINE

## 2024-10-21 RX ORDER — ALBUTEROL SULFATE 2.5 MG/.5ML
SOLUTION RESPIRATORY (INHALATION)
Status: DISPENSED
Start: 2024-10-21 | End: 2024-10-21

## 2024-10-30 ENCOUNTER — TELEPHONE (OUTPATIENT)
Dept: PULMONOLOGY | Facility: CLINIC | Age: 59
End: 2024-10-30
Payer: MEDICARE

## 2024-11-01 ENCOUNTER — TELEPHONE (OUTPATIENT)
Dept: PULMONOLOGY | Facility: CLINIC | Age: 59
End: 2024-11-01
Payer: MEDICARE

## 2024-12-14 ENCOUNTER — OFFICE VISIT (OUTPATIENT)
Dept: URGENT CARE | Facility: CLINIC | Age: 59
End: 2024-12-14
Payer: MEDICARE

## 2024-12-14 VITALS
RESPIRATION RATE: 16 BRPM | HEART RATE: 87 BPM | BODY MASS INDEX: 18.16 KG/M2 | TEMPERATURE: 98 F | HEIGHT: 66 IN | SYSTOLIC BLOOD PRESSURE: 164 MMHG | WEIGHT: 113 LBS | OXYGEN SATURATION: 99 % | DIASTOLIC BLOOD PRESSURE: 87 MMHG

## 2024-12-14 DIAGNOSIS — S80.811A ABRASION OF ANTERIOR RIGHT LOWER LEG, INITIAL ENCOUNTER: ICD-10-CM
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
## 2024-12-14 DIAGNOSIS — M79.661 PAIN IN BOTH LOWER LEGS: ICD-10-CM

## 2024-12-14 DIAGNOSIS — S80.812A ABRASION OF ANTERIOR LEFT LOWER LEG, INITIAL ENCOUNTER: ICD-10-CM

## 2024-12-14 DIAGNOSIS — M25.572 ACUTE LEFT ANKLE PAIN: ICD-10-CM

## 2024-12-14 DIAGNOSIS — M79.662 PAIN IN BOTH LOWER LEGS: ICD-10-CM

## 2024-12-14 PROCEDURE — 99204 OFFICE O/P NEW MOD 45 MIN: CPT | Mod: 25,S$GLB,, | Performed by: NURSE PRACTITIONER

## 2024-12-14 PROCEDURE — 90471 IMMUNIZATION ADMIN: CPT | Mod: S$GLB,,, | Performed by: NURSE PRACTITIONER

## 2024-12-14 PROCEDURE — 90715 TDAP VACCINE 7 YRS/> IM: CPT | Mod: S$GLB,,, | Performed by: NURSE PRACTITIONER

## 2024-12-14 RX ORDER — MUPIROCIN 20 MG/G
OINTMENT TOPICAL 3 TIMES DAILY
Qty: 22 G | Refills: 0 | Status: SHIPPED | OUTPATIENT
Start: 2024-12-14 | End: 2024-12-21

## 2024-12-14 NOTE — PATIENT INSTRUCTIONS
Increase clear fluid intake    May apply ice to areas for 15 minutes every 2 hours as needed for pain and swelling.  After 48 hours transition to moist heat or heating pad.  Be cognizant not to fall asleep while using a heating pad as this could cause severe burns.    You may alternate Tylenol Motrin as needed for pain.  You can take Norco as needed for severe pain   Keep wounds clean and covered until healed  Wash wound with clean, soapy water and change dressing daily  Apply mupirocin ointment 2-3 x daily and with dressing changes  Follow up with PCP  Go directly to the er for any worsening, or emergent concerns  Return to clinic for new concerns.

## 2024-12-14 NOTE — PROGRESS NOTES
"Subjective:      Patient ID: Marianela Potter is a 59 y.o. female.    Vitals:  height is 5' 6" (1.676 m) and weight is 51.3 kg (113 lb). Her temperature is 97.7 °F (36.5 °C). Her blood pressure is 164/87 (abnormal) and her pulse is 87. Her respiration is 16 and oxygen saturation is 99%.     Chief Complaint: Fall and Leg Injury    59-year-old female seen today for bilateral lower leg abrasions in left ankle pain.  She states just prior to arrival she had stepped off of the asphalt parking lot at a local store and stepped into the flower bed mulch.  There was a covered utility box under the mulch and when she stepped on the lid she fell into the box, falling forward and striking her shins on the front of the box.  She states she then fell backwards onto her buttocks and sat down.  She denies striking her head.  She states Last Tdap vaccination is unknown.    Fall  The accident occurred 1 to 3 hours ago. Pertinent negatives include no fever, nausea or vomiting.       Constitution: Negative for chills and fever.   Cardiovascular:  Negative for chest pain, palpitations and sob on exertion.   Respiratory:  Negative for shortness of breath.    Gastrointestinal:  Negative for nausea and vomiting.   Musculoskeletal:  Positive for pain and trauma. Negative for joint pain, joint swelling and muscle ache.   Skin:  Positive for abrasion. Negative for rash.   Neurological:  Negative for dizziness, light-headedness, passing out, disorientation and altered mental status.   Psychiatric/Behavioral:  Negative for altered mental status, disorientation and confusion.       Objective:     Physical Exam   Constitutional: She is oriented to person, place, and time. She appears well-developed. She is cooperative.  Non-toxic appearance. She does not appear ill. No distress.   HENT:   Head: Normocephalic and atraumatic.   Ears:   Right Ear: Hearing and external ear normal.   Left Ear: Hearing and external ear normal.   Nose: Nose " normal. No mucosal edema, rhinorrhea, nasal deformity or congestion. No epistaxis. Right sinus exhibits no maxillary sinus tenderness and no frontal sinus tenderness. Left sinus exhibits no maxillary sinus tenderness and no frontal sinus tenderness.   Mouth/Throat: Uvula is midline, oropharynx is clear and moist and mucous membranes are normal. Mucous membranes are moist. No trismus in the jaw. Normal dentition. No uvula swelling. No oropharyngeal exudate, posterior oropharyngeal edema, posterior oropharyngeal erythema, tonsillar abscesses or cobblestoning.   Eyes: Conjunctivae and lids are normal. No scleral icterus.   Neck: Trachea normal and phonation normal. Neck supple. No edema present. No erythema present. No neck rigidity present.   Cardiovascular: Normal rate, regular rhythm, normal heart sounds and normal pulses.   Pulmonary/Chest: Effort normal and breath sounds normal. No stridor. No respiratory distress. She has no decreased breath sounds.   Abdominal: Normal appearance.   Musculoskeletal:         General: No deformity.      Left ankle: She exhibits decreased range of motion. She exhibits no swelling, no ecchymosis, no deformity, no laceration and normal pulse. Tenderness. Medial malleolus tenderness found.        Legs:    Neurological: no focal deficit. She is alert and oriented to person, place, and time. She exhibits normal muscle tone. Coordination normal.   Skin: Skin is warm, dry, intact, not diaphoretic and not pale. Capillary refill takes 2 to 3 seconds. not left ankle       Psychiatric: Her speech is normal and behavior is normal. Judgment and thought content normal.   Nursing note and vitals reviewed.      Assessment:     1. Fall, initial encounter    2. Pain in both lower legs    3. Abrasion of anterior left lower leg, initial encounter    4. Acute left ankle pain    5. Abrasion of anterior right lower leg, initial encounter        Plan:       Fall, initial encounter  -     XR TIBIA FIBULA 2  VIEW RIGHT; Future; Expected date: 12/14/2024  -     XR TIBIA FIBULA 2 VIEW LEFT; Future; Expected date: 12/14/2024  -     XR ANKLE COMPLETE 3 VIEW LEFT; Future; Expected date: 12/14/2024  -     mupirocin (BACTROBAN) 2 % ointment; Apply topically 3 (three) times daily. for 7 days  Dispense: 22 g; Refill: 0    Pain in both lower legs  -     XR TIBIA FIBULA 2 VIEW RIGHT; Future; Expected date: 12/14/2024  -     XR TIBIA FIBULA 2 VIEW LEFT; Future; Expected date: 12/14/2024    Abrasion of anterior left lower leg, initial encounter  -     XR TIBIA FIBULA 2 VIEW LEFT; Future; Expected date: 12/14/2024  -     Tdap Vaccine  -     mupirocin (BACTROBAN) 2 % ointment; Apply topically 3 (three) times daily. for 7 days  Dispense: 22 g; Refill: 0    Acute left ankle pain  -     XR ANKLE COMPLETE 3 VIEW LEFT; Future; Expected date: 12/14/2024    Abrasion of anterior right lower leg, initial encounter  -     XR TIBIA FIBULA 2 VIEW RIGHT; Future; Expected date: 12/14/2024  -     Tdap Vaccine  -     mupirocin (BACTROBAN) 2 % ointment; Apply topically 3 (three) times daily. for 7 days  Dispense: 22 g; Refill: 0      X-ray-native bilateral tib-fib and left ankle x-rays    The physical exam findings were discussed with the patient and all questions answered.  Her wounds were cleaned and dressed with mupirocin ointment and Telfa bandages prior to discharge.  She denied a need for any pain medication as she states she has pain medication at home.  We discussed symptom monitoring, conservative care methods, medication use, and follow up orders. she verbalized understanding and agreement with the plan of care.

## 2025-05-29 ENCOUNTER — TELEPHONE (OUTPATIENT)
Facility: CLINIC | Age: 60
End: 2025-05-29
Payer: MEDICARE

## 2025-07-10 ENCOUNTER — TELEPHONE (OUTPATIENT)
Facility: CLINIC | Age: 60
End: 2025-07-10
Payer: MEDICARE

## 2025-07-10 NOTE — TELEPHONE ENCOUNTER
I have attempted without success to contact this patient by phone to confirm upcoming appointment w/ Dr. Charlton on 7/16/25.